# Patient Record
Sex: FEMALE | Race: WHITE | NOT HISPANIC OR LATINO | Employment: OTHER | URBAN - METROPOLITAN AREA
[De-identification: names, ages, dates, MRNs, and addresses within clinical notes are randomized per-mention and may not be internally consistent; named-entity substitution may affect disease eponyms.]

---

## 2018-01-25 ENCOUNTER — OFFICE VISIT CONVERTED (OUTPATIENT)
Dept: FAMILY MEDICINE CLINIC | Facility: CLINIC | Age: 43
End: 2018-01-25
Attending: NURSE PRACTITIONER

## 2018-03-19 ENCOUNTER — OFFICE VISIT CONVERTED (OUTPATIENT)
Dept: FAMILY MEDICINE CLINIC | Facility: CLINIC | Age: 43
End: 2018-03-19
Attending: NURSE PRACTITIONER

## 2018-06-28 ENCOUNTER — OFFICE VISIT CONVERTED (OUTPATIENT)
Dept: FAMILY MEDICINE CLINIC | Facility: CLINIC | Age: 43
End: 2018-06-28
Attending: NURSE PRACTITIONER

## 2018-06-28 ENCOUNTER — CONVERSION ENCOUNTER (OUTPATIENT)
Dept: FAMILY MEDICINE CLINIC | Facility: CLINIC | Age: 43
End: 2018-06-28

## 2018-09-17 ENCOUNTER — CONVERSION ENCOUNTER (OUTPATIENT)
Dept: FAMILY MEDICINE CLINIC | Facility: CLINIC | Age: 43
End: 2018-09-17

## 2018-09-17 ENCOUNTER — OFFICE VISIT CONVERTED (OUTPATIENT)
Dept: FAMILY MEDICINE CLINIC | Facility: CLINIC | Age: 43
End: 2018-09-17
Attending: NURSE PRACTITIONER

## 2018-12-13 ENCOUNTER — CONVERSION ENCOUNTER (OUTPATIENT)
Dept: FAMILY MEDICINE CLINIC | Facility: CLINIC | Age: 43
End: 2018-12-13

## 2019-01-10 ENCOUNTER — OFFICE VISIT CONVERTED (OUTPATIENT)
Dept: FAMILY MEDICINE CLINIC | Facility: CLINIC | Age: 44
End: 2019-01-10
Attending: NURSE PRACTITIONER

## 2019-03-26 ENCOUNTER — OFFICE VISIT CONVERTED (OUTPATIENT)
Dept: FAMILY MEDICINE CLINIC | Facility: CLINIC | Age: 44
End: 2019-03-26
Attending: NURSE PRACTITIONER

## 2019-04-02 ENCOUNTER — OFFICE VISIT CONVERTED (OUTPATIENT)
Dept: SURGERY | Facility: CLINIC | Age: 44
End: 2019-04-02
Attending: SURGERY

## 2019-04-10 ENCOUNTER — HOSPITAL ENCOUNTER (OUTPATIENT)
Dept: GASTROENTEROLOGY | Facility: HOSPITAL | Age: 44
Setting detail: HOSPITAL OUTPATIENT SURGERY
Discharge: HOME | End: 2019-04-10
Attending: SURGERY

## 2019-04-10 LAB — HCG UR QL: NEGATIVE

## 2019-05-24 ENCOUNTER — OFFICE VISIT CONVERTED (OUTPATIENT)
Dept: FAMILY MEDICINE CLINIC | Facility: CLINIC | Age: 44
End: 2019-05-24
Attending: NURSE PRACTITIONER

## 2019-05-24 ENCOUNTER — CONVERSION ENCOUNTER (OUTPATIENT)
Dept: FAMILY MEDICINE CLINIC | Facility: CLINIC | Age: 44
End: 2019-05-24

## 2019-06-04 ENCOUNTER — OFFICE VISIT CONVERTED (OUTPATIENT)
Dept: FAMILY MEDICINE CLINIC | Facility: CLINIC | Age: 44
End: 2019-06-04
Attending: NURSE PRACTITIONER

## 2019-07-01 ENCOUNTER — HOSPITAL ENCOUNTER (OUTPATIENT)
Dept: FAMILY MEDICINE CLINIC | Facility: CLINIC | Age: 44
Discharge: HOME | End: 2019-07-01
Attending: NURSE PRACTITIONER

## 2019-07-01 LAB
ALBUMIN SERPL-MCNC: 3.9 G/DL (ref 3.5–5)
ALBUMIN/GLOB SERPL: 1.4 {RATIO} (ref 1.4–2.6)
ALP SERPL-CCNC: 55 U/L (ref 42–98)
ALT SERPL-CCNC: 14 U/L (ref 10–40)
ANION GAP SERPL CALC-SCNC: 17 MMOL/L (ref 8–19)
AST SERPL-CCNC: 21 U/L (ref 15–50)
BASOPHILS # BLD AUTO: 0.06 10*3/UL (ref 0–0.2)
BASOPHILS NFR BLD AUTO: 0.9 % (ref 0–3)
BILIRUB SERPL-MCNC: 0.24 MG/DL (ref 0.2–1.3)
BUN SERPL-MCNC: 11 MG/DL (ref 5–25)
BUN/CREAT SERPL: 12 {RATIO} (ref 6–20)
CALCIUM SERPL-MCNC: 9 MG/DL (ref 8.7–10.4)
CHLORIDE SERPL-SCNC: 105 MMOL/L (ref 99–111)
CHOLEST SERPL-MCNC: 128 MG/DL (ref 107–200)
CHOLEST/HDLC SERPL: 3.7 {RATIO} (ref 3–6)
CONV ABS IMM GRAN: 0.02 10*3/UL (ref 0–0.2)
CONV CO2: 24 MMOL/L (ref 22–32)
CONV IMMATURE GRAN: 0.3 % (ref 0–1.8)
CONV TOTAL PROTEIN: 6.7 G/DL (ref 6.3–8.2)
CREAT UR-MCNC: 0.95 MG/DL (ref 0.5–0.9)
DEPRECATED RDW RBC AUTO: 45.7 FL (ref 36.4–46.3)
EOSINOPHIL # BLD AUTO: 0.22 10*3/UL (ref 0–0.7)
EOSINOPHIL # BLD AUTO: 3.2 % (ref 0–7)
ERYTHROCYTE [DISTWIDTH] IN BLOOD BY AUTOMATED COUNT: 13.2 % (ref 11.7–14.4)
GFR SERPLBLD BASED ON 1.73 SQ M-ARVRAT: >60 ML/MIN/{1.73_M2}
GLOBULIN UR ELPH-MCNC: 2.8 G/DL (ref 2–3.5)
GLUCOSE SERPL-MCNC: 86 MG/DL (ref 65–99)
HBA1C MFR BLD: 13 G/DL (ref 12–16)
HCT VFR BLD AUTO: 41.8 % (ref 37–47)
HDLC SERPL-MCNC: 35 MG/DL (ref 40–60)
LDLC SERPL CALC-MCNC: 79 MG/DL (ref 70–100)
LYMPHOCYTES # BLD AUTO: 2.86 10*3/UL (ref 1–5)
MCH RBC QN AUTO: 29.3 PG (ref 27–31)
MCHC RBC AUTO-ENTMCNC: 31.1 G/DL (ref 33–37)
MCV RBC AUTO: 94.1 FL (ref 81–99)
MONOCYTES # BLD AUTO: 0.53 10*3/UL (ref 0.2–1.2)
MONOCYTES NFR BLD AUTO: 7.6 % (ref 3–10)
NEUTROPHILS # BLD AUTO: 3.25 10*3/UL (ref 2–8)
NEUTROPHILS NFR BLD AUTO: 46.8 % (ref 30–85)
NRBC CBCN: 0 % (ref 0–0.7)
OSMOLALITY SERPL CALC.SUM OF ELEC: 291 MOSM/KG (ref 273–304)
PLATELET # BLD AUTO: 461 10*3/UL (ref 130–400)
PMV BLD AUTO: 11.5 FL (ref 9.4–12.3)
POTASSIUM SERPL-SCNC: 4.6 MMOL/L (ref 3.5–5.3)
RBC # BLD AUTO: 4.44 10*6/UL (ref 4.2–5.4)
SODIUM SERPL-SCNC: 141 MMOL/L (ref 135–147)
TRIGL SERPL-MCNC: 72 MG/DL (ref 40–150)
VARIANT LYMPHS NFR BLD MANUAL: 41.2 % (ref 20–45)
VLDLC SERPL-MCNC: 14 MG/DL (ref 5–37)
WBC # BLD AUTO: 6.94 10*3/UL (ref 4.8–10.8)

## 2019-07-02 LAB — 25(OH)D3 SERPL-MCNC: 38.6 NG/ML (ref 30–100)

## 2019-08-05 ENCOUNTER — HOSPITAL ENCOUNTER (OUTPATIENT)
Dept: OTHER | Facility: HOSPITAL | Age: 44
Discharge: HOME | End: 2019-08-05

## 2019-08-08 ENCOUNTER — HOSPITAL ENCOUNTER (OUTPATIENT)
Dept: OTHER | Facility: HOSPITAL | Age: 44
Discharge: HOME | End: 2019-08-08

## 2019-08-08 LAB
25(OH)D3 SERPL-MCNC: 44.4 NG/ML (ref 30–100)
ALBUMIN SERPL-MCNC: 4.5 G/DL (ref 3.5–5)
ALBUMIN/GLOB SERPL: 1.6 {RATIO} (ref 1.4–2.6)
ALP SERPL-CCNC: 79 U/L (ref 42–98)
ALT SERPL-CCNC: 13 U/L (ref 10–40)
ANION GAP SERPL CALC-SCNC: 16 MMOL/L (ref 8–19)
AST SERPL-CCNC: 18 U/L (ref 15–50)
BILIRUB SERPL-MCNC: 0.23 MG/DL (ref 0.2–1.3)
BUN SERPL-MCNC: 16 MG/DL (ref 5–25)
BUN/CREAT SERPL: 18 {RATIO} (ref 6–20)
CALCIUM SERPL-MCNC: 9.2 MG/DL (ref 8.7–10.4)
CHLORIDE SERPL-SCNC: 98 MMOL/L (ref 99–111)
CHOLEST SERPL-MCNC: 139 MG/DL (ref 107–200)
CHOLEST/HDLC SERPL: 4 {RATIO} (ref 3–6)
CONV CO2: 23 MMOL/L (ref 22–32)
CONV TOTAL PROTEIN: 7.3 G/DL (ref 6.3–8.2)
CREAT UR-MCNC: 0.87 MG/DL (ref 0.5–0.9)
GFR SERPLBLD BASED ON 1.73 SQ M-ARVRAT: >60 ML/MIN/{1.73_M2}
GLOBULIN UR ELPH-MCNC: 2.8 G/DL (ref 2–3.5)
GLUCOSE SERPL-MCNC: 89 MG/DL (ref 65–99)
HDLC SERPL-MCNC: 35 MG/DL (ref 40–60)
LDLC SERPL CALC-MCNC: 81 MG/DL (ref 70–100)
OSMOLALITY SERPL CALC.SUM OF ELEC: 277 MOSM/KG (ref 273–304)
POTASSIUM SERPL-SCNC: 4.1 MMOL/L (ref 3.5–5.3)
SODIUM SERPL-SCNC: 133 MMOL/L (ref 135–147)
TRIGL SERPL-MCNC: 116 MG/DL (ref 40–150)
VLDLC SERPL-MCNC: 23 MG/DL (ref 5–37)

## 2019-08-09 LAB
B BURGDOR IGG+IGM SER-ACNC: <0.91 ISR (ref 0–0.9)
H PYLORI IGA SER QL: <9 UNITS (ref 0–8.9)
H PYLORI IGM SER-ACNC: <9 UNITS (ref 0–8.9)

## 2019-08-10 LAB
R RICKETTSI IGG SER QL IA: NEGATIVE
R RICKETTSI IGM TITR SER: 1.11 INDEX (ref 0–0.89)

## 2019-08-11 LAB
CONV CELIAC DISEASE AB-IGA: 175 MG/DL (ref 68–408)
GLUTEN IGE QN: 8.16 MCG/ML
TTG IGA SER-ACNC: 0 U/ML (ref 0–3)

## 2019-08-12 LAB
BARLEY IGE QN: <0.1
BEEF IGE QN: <0.1 K[IU]/ML (ref 0–0.35)
CHICKEN DROP IGE QN: <0.1
COCOA IGE QN: <0.1 K[IU]/ML (ref 0–0.35)
CONV SCORING INTERPRETATION: NORMAL
CORN IGE QN: <0.1 K[IU]/ML (ref 0–0.35)
COW MILK IGE QN: <0.1 K[IU]/ML (ref 0–0.35)
EGG WHITE IGE QN: <0.1 K[IU]/ML (ref 0–0.35)
GLUTEN IGE QN: <0.1 KU/L
IGE BREWER'S YEAST: <0.1 K[IU]/ML (ref 0–0.35)
IGE SERPL-ACNC: 39 K[IU]/ML (ref 0–24)
IMMUNOCAP RESULT: ABNORMAL (ref 0–0)
LETTUCE IGE QN: <0.1 K[IU]/ML
MALT IGE QN: <0.1
OAT IGE QN: <0.1 K[IU]/ML (ref 0–0.35)
ORANGE IGE QN: <0.1
PEANUT IGE QN: <0.1 K[IU]/ML (ref 0–0.35)
PORK IGE: <0.1 K[IU]/ML (ref 0–0.35)
POTATO IGE QN: <0.1 K[IU]/ML (ref 0–0.35)
RYE IGE: <0.1
SOYBEAN IGE QN: <0.1 K[IU]/ML (ref 0–0.35)
TOMATO IGE QN: <0.1
WHEAT IGE QN: <0.1 K[IU]/ML (ref 0–0.35)

## 2019-08-13 LAB — CONV ANTI GALACTOSE ALPHA 1,3 IGE: <0.1 KU/L

## 2021-05-15 VITALS
TEMPERATURE: 98.1 F | SYSTOLIC BLOOD PRESSURE: 114 MMHG | RESPIRATION RATE: 16 BRPM | HEART RATE: 70 BPM | OXYGEN SATURATION: 96 % | DIASTOLIC BLOOD PRESSURE: 76 MMHG | HEIGHT: 63 IN | WEIGHT: 180.25 LBS | BODY MASS INDEX: 31.94 KG/M2

## 2021-05-15 VITALS
SYSTOLIC BLOOD PRESSURE: 122 MMHG | OXYGEN SATURATION: 98 % | HEART RATE: 78 BPM | RESPIRATION RATE: 12 BRPM | WEIGHT: 176 LBS | DIASTOLIC BLOOD PRESSURE: 80 MMHG | TEMPERATURE: 98.8 F | HEIGHT: 63 IN | BODY MASS INDEX: 31.18 KG/M2

## 2021-05-15 VITALS — HEIGHT: 63 IN | RESPIRATION RATE: 14 BRPM | BODY MASS INDEX: 32.62 KG/M2 | WEIGHT: 184.12 LBS

## 2021-05-15 VITALS
OXYGEN SATURATION: 97 % | RESPIRATION RATE: 12 BRPM | BODY MASS INDEX: 32.86 KG/M2 | WEIGHT: 185.44 LBS | HEART RATE: 74 BPM | HEIGHT: 63 IN | DIASTOLIC BLOOD PRESSURE: 80 MMHG | TEMPERATURE: 98.5 F | SYSTOLIC BLOOD PRESSURE: 129 MMHG

## 2021-05-16 VITALS
HEIGHT: 63 IN | DIASTOLIC BLOOD PRESSURE: 80 MMHG | TEMPERATURE: 98.2 F | WEIGHT: 184.19 LBS | HEART RATE: 95 BPM | RESPIRATION RATE: 12 BRPM | SYSTOLIC BLOOD PRESSURE: 131 MMHG | BODY MASS INDEX: 32.64 KG/M2 | OXYGEN SATURATION: 98 %

## 2021-05-16 VITALS
SYSTOLIC BLOOD PRESSURE: 136 MMHG | TEMPERATURE: 97.8 F | OXYGEN SATURATION: 99 % | HEIGHT: 63 IN | HEART RATE: 80 BPM | RESPIRATION RATE: 12 BRPM | DIASTOLIC BLOOD PRESSURE: 78 MMHG | BODY MASS INDEX: 33.57 KG/M2 | WEIGHT: 189.44 LBS

## 2021-05-16 VITALS — SYSTOLIC BLOOD PRESSURE: 118 MMHG | DIASTOLIC BLOOD PRESSURE: 70 MMHG

## 2021-05-16 VITALS
WEIGHT: 195 LBS | SYSTOLIC BLOOD PRESSURE: 144 MMHG | DIASTOLIC BLOOD PRESSURE: 90 MMHG | BODY MASS INDEX: 34.55 KG/M2 | HEART RATE: 117 BPM | HEIGHT: 63 IN | TEMPERATURE: 97.7 F | RESPIRATION RATE: 12 BRPM | OXYGEN SATURATION: 97 %

## 2021-05-16 VITALS
DIASTOLIC BLOOD PRESSURE: 81 MMHG | SYSTOLIC BLOOD PRESSURE: 133 MMHG | HEIGHT: 63 IN | HEART RATE: 102 BPM | BODY MASS INDEX: 33.66 KG/M2 | OXYGEN SATURATION: 97 % | TEMPERATURE: 97.8 F | RESPIRATION RATE: 12 BRPM | WEIGHT: 190 LBS

## 2021-05-16 VITALS — DIASTOLIC BLOOD PRESSURE: 78 MMHG | SYSTOLIC BLOOD PRESSURE: 118 MMHG

## 2021-05-16 VITALS
TEMPERATURE: 98.6 F | HEART RATE: 107 BPM | BODY MASS INDEX: 33.23 KG/M2 | WEIGHT: 187.56 LBS | SYSTOLIC BLOOD PRESSURE: 134 MMHG | DIASTOLIC BLOOD PRESSURE: 86 MMHG | HEIGHT: 63 IN | RESPIRATION RATE: 12 BRPM | OXYGEN SATURATION: 99 %

## 2022-05-02 ENCOUNTER — LAB REQUISITION (OUTPATIENT)
Dept: LAB | Facility: HOSPITAL | Age: 47
End: 2022-05-02

## 2022-05-02 DIAGNOSIS — L08.9 LOCAL INFECTION OF THE SKIN AND SUBCUTANEOUS TISSUE, UNSPECIFIED: ICD-10-CM

## 2022-05-02 LAB
ALBUMIN SERPL-MCNC: 3.9 G/DL (ref 3.5–5.2)
ALBUMIN/GLOB SERPL: 1.3 G/DL
ALP SERPL-CCNC: 90 U/L (ref 39–117)
ALT SERPL W P-5'-P-CCNC: 42 U/L (ref 1–33)
ANION GAP SERPL CALCULATED.3IONS-SCNC: 10 MMOL/L (ref 5–15)
AST SERPL-CCNC: 24 U/L (ref 1–32)
BASOPHILS # BLD AUTO: 0.06 10*3/MM3 (ref 0–0.2)
BASOPHILS NFR BLD AUTO: 0.7 % (ref 0–1.5)
BILIRUB SERPL-MCNC: <0.2 MG/DL (ref 0–1.2)
BUN SERPL-MCNC: 9 MG/DL (ref 6–20)
BUN/CREAT SERPL: 13.4 (ref 7–25)
CALCIUM SPEC-SCNC: 9.3 MG/DL (ref 8.6–10.5)
CHLORIDE SERPL-SCNC: 100 MMOL/L (ref 98–107)
CO2 SERPL-SCNC: 25 MMOL/L (ref 22–29)
CREAT SERPL-MCNC: 0.67 MG/DL (ref 0.57–1)
CRP SERPL-MCNC: 0.4 MG/DL (ref 0–0.5)
DEPRECATED RDW RBC AUTO: 43.8 FL (ref 37–54)
EGFRCR SERPLBLD CKD-EPI 2021: 110 ML/MIN/1.73
EOSINOPHIL # BLD AUTO: 0.21 10*3/MM3 (ref 0–0.4)
EOSINOPHIL NFR BLD AUTO: 2.6 % (ref 0.3–6.2)
ERYTHROCYTE [DISTWIDTH] IN BLOOD BY AUTOMATED COUNT: 14.1 % (ref 12.3–15.4)
ERYTHROCYTE [SEDIMENTATION RATE] IN BLOOD: 14 MM/HR (ref 0–20)
GLOBULIN UR ELPH-MCNC: 2.9 GM/DL
GLUCOSE SERPL-MCNC: 109 MG/DL (ref 65–99)
HCT VFR BLD AUTO: 33.8 % (ref 34–46.6)
HGB BLD-MCNC: 11.2 G/DL (ref 12–15.9)
IMM GRANULOCYTES # BLD AUTO: 0.04 10*3/MM3 (ref 0–0.05)
IMM GRANULOCYTES NFR BLD AUTO: 0.5 % (ref 0–0.5)
LYMPHOCYTES # BLD AUTO: 2.11 10*3/MM3 (ref 0.7–3.1)
LYMPHOCYTES NFR BLD AUTO: 25.9 % (ref 19.6–45.3)
MCH RBC QN AUTO: 28.4 PG (ref 26.6–33)
MCHC RBC AUTO-ENTMCNC: 33.1 G/DL (ref 31.5–35.7)
MCV RBC AUTO: 85.6 FL (ref 79–97)
MONOCYTES # BLD AUTO: 0.5 10*3/MM3 (ref 0.1–0.9)
MONOCYTES NFR BLD AUTO: 6.1 % (ref 5–12)
NEUTROPHILS NFR BLD AUTO: 5.22 10*3/MM3 (ref 1.7–7)
NEUTROPHILS NFR BLD AUTO: 64.2 % (ref 42.7–76)
NRBC BLD AUTO-RTO: 0 /100 WBC (ref 0–0.2)
PLATELET # BLD AUTO: 537 10*3/MM3 (ref 140–450)
PMV BLD AUTO: 10.5 FL (ref 6–12)
POTASSIUM SERPL-SCNC: 4.1 MMOL/L (ref 3.5–5.2)
PROT SERPL-MCNC: 6.8 G/DL (ref 6–8.5)
RBC # BLD AUTO: 3.95 10*6/MM3 (ref 3.77–5.28)
SODIUM SERPL-SCNC: 135 MMOL/L (ref 136–145)
VANCOMYCIN TROUGH SERPL-MCNC: 5.8 MCG/ML (ref 5–20)
WBC NRBC COR # BLD: 8.14 10*3/MM3 (ref 3.4–10.8)

## 2022-05-02 PROCEDURE — 85652 RBC SED RATE AUTOMATED: CPT | Performed by: ORTHOPAEDIC SURGERY

## 2022-05-02 PROCEDURE — 80202 ASSAY OF VANCOMYCIN: CPT | Performed by: ORTHOPAEDIC SURGERY

## 2022-05-02 PROCEDURE — 85025 COMPLETE CBC W/AUTO DIFF WBC: CPT | Performed by: ORTHOPAEDIC SURGERY

## 2022-05-02 PROCEDURE — 86140 C-REACTIVE PROTEIN: CPT | Performed by: ORTHOPAEDIC SURGERY

## 2022-05-02 PROCEDURE — 80053 COMPREHEN METABOLIC PANEL: CPT | Performed by: ORTHOPAEDIC SURGERY

## 2022-05-06 ENCOUNTER — LAB REQUISITION (OUTPATIENT)
Dept: LAB | Facility: HOSPITAL | Age: 47
End: 2022-05-06

## 2022-05-06 DIAGNOSIS — Z79.899 OTHER LONG TERM (CURRENT) DRUG THERAPY: ICD-10-CM

## 2022-05-06 DIAGNOSIS — L08.9 LOCAL INFECTION OF THE SKIN AND SUBCUTANEOUS TISSUE, UNSPECIFIED: ICD-10-CM

## 2022-05-06 LAB — VANCOMYCIN TROUGH SERPL-MCNC: <4 MCG/ML (ref 5–20)

## 2022-05-06 PROCEDURE — 80202 ASSAY OF VANCOMYCIN: CPT | Performed by: ORTHOPAEDIC SURGERY

## 2023-01-19 ENCOUNTER — HOSPITAL ENCOUNTER (OUTPATIENT)
Dept: MAMMOGRAPHY | Facility: HOSPITAL | Age: 48
Discharge: HOME | End: 2023-01-19
Payer: MEDICARE

## 2023-01-19 ENCOUNTER — HOSPITAL ENCOUNTER (OUTPATIENT)
Dept: ULTRASOUND IMAGING | Facility: HOSPITAL | Age: 48
Discharge: HOME | End: 2023-01-19
Payer: MEDICARE

## 2023-01-19 DIAGNOSIS — N63.10 BREAST MASS, RIGHT: ICD-10-CM

## 2023-01-19 PROCEDURE — 76642 ULTRASOUND BREAST LIMITED: CPT

## 2023-01-19 PROCEDURE — G0279 TOMOSYNTHESIS, MAMMO: HCPCS

## 2023-01-19 PROCEDURE — 77066 DX MAMMO INCL CAD BI: CPT

## 2023-01-26 ENCOUNTER — PATIENT OUTREACH (OUTPATIENT)
Dept: ONCOLOGY | Facility: HOSPITAL | Age: 48
End: 2023-01-26
Payer: MEDICARE

## 2023-01-26 ENCOUNTER — HOSPITAL ENCOUNTER (OUTPATIENT)
Dept: MAMMOGRAPHY | Facility: HOSPITAL | Age: 48
Discharge: HOME | End: 2023-01-26
Payer: MEDICARE

## 2023-02-01 ENCOUNTER — PATIENT OUTREACH (OUTPATIENT)
Dept: ONCOLOGY | Facility: HOSPITAL | Age: 48
End: 2023-02-01
Payer: MEDICARE

## 2023-02-01 ENCOUNTER — HOSPITAL ENCOUNTER (OUTPATIENT)
Dept: MAMMOGRAPHY | Facility: HOSPITAL | Age: 48
Discharge: HOME | End: 2023-02-01
Payer: MEDICARE

## 2023-02-06 ENCOUNTER — HOSPITAL ENCOUNTER (OUTPATIENT)
Dept: MAMMOGRAPHY | Facility: HOSPITAL | Age: 48
Discharge: HOME | End: 2023-02-06
Payer: MEDICARE

## 2023-02-06 ENCOUNTER — PATIENT OUTREACH (OUTPATIENT)
Dept: ONCOLOGY | Facility: HOSPITAL | Age: 48
End: 2023-02-06
Payer: MEDICARE

## 2023-02-06 DIAGNOSIS — R22.31 AXILLARY MASS, RIGHT: ICD-10-CM

## 2023-02-06 DIAGNOSIS — N63.10 BREAST MASS, RIGHT: ICD-10-CM

## 2023-02-06 PROCEDURE — 88360 TUMOR IMMUNOHISTOCHEM/MANUAL: CPT | Performed by: SURGERY

## 2023-02-06 PROCEDURE — 88305 TISSUE EXAM BY PATHOLOGIST: CPT | Performed by: SURGERY

## 2023-02-06 PROCEDURE — 0 LIDOCAINE 1 % SOLUTION: Performed by: SURGERY

## 2023-02-06 PROCEDURE — 88341 IMHCHEM/IMCYTCHM EA ADD ANTB: CPT | Performed by: SURGERY

## 2023-02-06 PROCEDURE — A4648 IMPLANTABLE TISSUE MARKER: HCPCS

## 2023-02-06 PROCEDURE — 88342 IMHCHEM/IMCYTCHM 1ST ANTB: CPT | Performed by: SURGERY

## 2023-02-06 RX ORDER — LIDOCAINE HYDROCHLORIDE 10 MG/ML
10 INJECTION, SOLUTION INFILTRATION; PERINEURAL ONCE
Status: COMPLETED | OUTPATIENT
Start: 2023-02-06 | End: 2023-02-06

## 2023-02-06 RX ORDER — LIDOCAINE HYDROCHLORIDE AND EPINEPHRINE 10; 10 MG/ML; UG/ML
10 INJECTION, SOLUTION INFILTRATION; PERINEURAL ONCE
Status: COMPLETED | OUTPATIENT
Start: 2023-02-06 | End: 2023-02-06

## 2023-02-06 RX ADMIN — LIDOCAINE HYDROCHLORIDE 10 ML: 10 INJECTION, SOLUTION INFILTRATION; PERINEURAL at 13:52

## 2023-02-06 RX ADMIN — LIDOCAINE HYDROCHLORIDE,EPINEPHRINE BITARTRATE 10 ML: 10; .01 INJECTION, SOLUTION INFILTRATION; PERINEURAL at 13:52

## 2023-02-06 RX ADMIN — LIDOCAINE HYDROCHLORIDE,EPINEPHRINE BITARTRATE 20 ML: 10; .01 INJECTION, SOLUTION INFILTRATION; PERINEURAL at 13:51

## 2023-02-06 RX ADMIN — LIDOCAINE HYDROCHLORIDE 10 ML: 10 INJECTION, SOLUTION INFILTRATION; PERINEURAL at 13:50

## 2023-02-09 LAB
CYTO UR: NORMAL
LAB AP CASE REPORT: NORMAL
LAB AP CLINICAL INFORMATION: NORMAL
LAB AP SPECIAL STAINS: NORMAL
PATH REPORT.FINAL DX SPEC: NORMAL
PATH REPORT.GROSS SPEC: NORMAL

## 2023-02-17 ENCOUNTER — PATIENT OUTREACH (OUTPATIENT)
Dept: ONCOLOGY | Facility: HOSPITAL | Age: 48
End: 2023-02-17
Payer: MEDICARE

## 2023-02-20 ENCOUNTER — PATIENT OUTREACH (OUTPATIENT)
Dept: ONCOLOGY | Facility: HOSPITAL | Age: 48
End: 2023-02-20
Payer: MEDICARE

## 2023-03-09 ENCOUNTER — TELEPHONE (OUTPATIENT)
Dept: ONCOLOGY | Facility: HOSPITAL | Age: 48
End: 2023-03-09

## 2023-03-09 NOTE — TELEPHONE ENCOUNTER
Caller: ADIEL MUJICA    Relationship to patient: DR. CARBAJAL'S OFFICE    Best call back number: [phone]    Type of visit: NEW PATIENT    Requested date: ANY ASAP EXCEPT 03/14 AND 03/16    If rescheduling, when is the original appointment: 03/16    Additional notes: PLEASE CALL ADIEL TO R/S.

## 2023-03-10 ENCOUNTER — PATIENT OUTREACH (OUTPATIENT)
Dept: ONCOLOGY | Facility: HOSPITAL | Age: 48
End: 2023-03-10
Payer: MEDICARE

## 2023-03-20 ENCOUNTER — PATIENT OUTREACH (OUTPATIENT)
Dept: ONCOLOGY | Facility: HOSPITAL | Age: 48
End: 2023-03-20
Payer: MEDICARE

## 2023-03-20 NOTE — PROGRESS NOTES
CALLED [employer] AND THEY CALLED [facility] TO MAKE SURE THEY GOT THE ORDER TO SEND SPECIMEN TO RUN AN ONCOTYPE. THEY DID GET THE ORDER AND SAID THEY WILL SEND SPECIMEN. 3/20/23 [employer] HAS NOT RECEIVED THE SPECIMEN. SENT MESSAGE TO RAMONE TO INFORM HER OF STATUS.

## 2023-03-21 ENCOUNTER — PATIENT OUTREACH (OUTPATIENT)
Dept: ONCOLOGY | Facility: HOSPITAL | Age: 48
End: 2023-03-21
Payer: MEDICARE

## 2023-03-24 ENCOUNTER — PATIENT OUTREACH (OUTPATIENT)
Dept: ONCOLOGY | Facility: HOSPITAL | Age: 48
End: 2023-03-24
Payer: MEDICARE

## 2023-03-29 ENCOUNTER — PATIENT OUTREACH (OUTPATIENT)
Dept: ONCOLOGY | Facility: HOSPITAL | Age: 48
End: 2023-03-29
Payer: MEDICARE

## 2023-04-04 ENCOUNTER — PATIENT OUTREACH (OUTPATIENT)
Dept: ONCOLOGY | Facility: HOSPITAL | Age: 48
End: 2023-04-04
Payer: MEDICARE

## 2023-04-06 ENCOUNTER — DOCUMENTATION (OUTPATIENT)
Dept: ONCOLOGY | Facility: HOSPITAL | Age: 48
End: 2023-04-06
Payer: MEDICARE

## 2023-04-06 ENCOUNTER — LAB (OUTPATIENT)
Dept: ONCOLOGY | Facility: HOSPITAL | Age: 48
End: 2023-04-06
Payer: MEDICARE

## 2023-04-06 ENCOUNTER — CONSULT (OUTPATIENT)
Dept: ONCOLOGY | Facility: HOSPITAL | Age: 48
End: 2023-04-06
Payer: MEDICARE

## 2023-04-06 VITALS
RESPIRATION RATE: 16 BRPM | HEART RATE: 71 BPM | BODY MASS INDEX: 29.84 KG/M2 | WEIGHT: 168.43 LBS | DIASTOLIC BLOOD PRESSURE: 59 MMHG | TEMPERATURE: 97.8 F | SYSTOLIC BLOOD PRESSURE: 111 MMHG | OXYGEN SATURATION: 99 % | HEIGHT: 63 IN

## 2023-04-06 DIAGNOSIS — C50.911 MALIGNANT NEOPLASM OF RIGHT BREAST IN FEMALE, ESTROGEN RECEPTOR POSITIVE, UNSPECIFIED SITE OF BREAST: Primary | ICD-10-CM

## 2023-04-06 DIAGNOSIS — R11.2 NAUSEA AND VOMITING, UNSPECIFIED VOMITING TYPE: ICD-10-CM

## 2023-04-06 DIAGNOSIS — Z17.0 MALIGNANT NEOPLASM OF RIGHT BREAST IN FEMALE, ESTROGEN RECEPTOR POSITIVE, UNSPECIFIED SITE OF BREAST: ICD-10-CM

## 2023-04-06 DIAGNOSIS — Z89.512 HISTORY OF BELOW-KNEE AMPUTATION OF LEFT LOWER EXTREMITY: ICD-10-CM

## 2023-04-06 DIAGNOSIS — R63.4 WEIGHT LOSS, UNINTENTIONAL: ICD-10-CM

## 2023-04-06 DIAGNOSIS — C50.911 MALIGNANT NEOPLASM OF RIGHT BREAST IN FEMALE, ESTROGEN RECEPTOR POSITIVE, UNSPECIFIED SITE OF BREAST: ICD-10-CM

## 2023-04-06 DIAGNOSIS — F32.A DEPRESSION, UNSPECIFIED DEPRESSION TYPE: ICD-10-CM

## 2023-04-06 DIAGNOSIS — Z17.0 MALIGNANT NEOPLASM OF RIGHT BREAST IN FEMALE, ESTROGEN RECEPTOR POSITIVE, UNSPECIFIED SITE OF BREAST: Primary | ICD-10-CM

## 2023-04-06 DIAGNOSIS — M86.9 OSTEOMYELITIS OF LEFT TIBIA, UNSPECIFIED TYPE: ICD-10-CM

## 2023-04-06 PROBLEM — M79.605 PAIN OF LEFT LOWER EXTREMITY: Status: ACTIVE | Noted: 2022-02-18

## 2023-04-06 PROBLEM — F33.0 MILD EPISODE OF RECURRENT MAJOR DEPRESSIVE DISORDER: Status: ACTIVE | Noted: 2018-09-17

## 2023-04-06 PROBLEM — K21.9 GASTROESOPHAGEAL REFLUX DISEASE WITHOUT ESOPHAGITIS: Status: ACTIVE | Noted: 2018-09-17

## 2023-04-06 PROBLEM — E55.9 VITAMIN D DEFICIENCY: Status: ACTIVE | Noted: 2023-04-06

## 2023-04-06 PROBLEM — S88.119A AMPUTATED BELOW KNEE: Status: ACTIVE | Noted: 2018-04-29

## 2023-04-06 PROBLEM — T14.8XXA BROKEN BONES: Status: ACTIVE | Noted: 2021-02-13

## 2023-04-06 PROBLEM — B96.81 H PYLORI ULCER: Status: ACTIVE | Noted: 2023-04-06

## 2023-04-06 PROBLEM — F41.9 ANXIETY: Status: ACTIVE | Noted: 2023-04-06

## 2023-04-06 PROBLEM — M25.579 PAIN IN JOINT INVOLVING ANKLE AND FOOT: Status: ACTIVE | Noted: 2020-04-28

## 2023-04-06 PROBLEM — R20.2 PARESTHESIA: Status: ACTIVE | Noted: 2022-08-11

## 2023-04-06 PROBLEM — Z85.3 PERSONAL HISTORY OF BREAST CANCER: Status: ACTIVE | Noted: 2023-02-27

## 2023-04-06 PROBLEM — M79.2 PERIPHERAL NEURALGIA: Status: ACTIVE | Noted: 2020-04-28

## 2023-04-06 PROBLEM — T14.8XXA WOUND INFECTION: Status: ACTIVE | Noted: 2022-04-20

## 2023-04-06 PROBLEM — M79.672 LEFT FOOT PAIN: Status: ACTIVE | Noted: 2018-04-30

## 2023-04-06 PROBLEM — Z79.4 ENCOUNTER FOR LONG-TERM (CURRENT) USE OF INSULIN: Status: ACTIVE | Noted: 2018-02-27

## 2023-04-06 PROBLEM — R53.83 FATIGUE: Status: ACTIVE | Noted: 2018-09-17

## 2023-04-06 PROBLEM — D36.10 NEUROMA: Status: ACTIVE | Noted: 2022-08-11

## 2023-04-06 PROBLEM — L40.9 PSORIASIS: Status: ACTIVE | Noted: 2018-09-17

## 2023-04-06 PROBLEM — M25.50 MULTIPLE JOINT PAIN: Status: ACTIVE | Noted: 2018-02-27

## 2023-04-06 PROBLEM — N92.0 HEAVY PERIOD: Status: ACTIVE | Noted: 2018-09-17

## 2023-04-06 PROBLEM — G47.00 INSOMNIA, UNSPECIFIED: Status: ACTIVE | Noted: 2018-09-17

## 2023-04-06 PROBLEM — K27.9 H PYLORI ULCER: Status: ACTIVE | Noted: 2023-04-06

## 2023-04-06 PROBLEM — L08.9 WOUND INFECTION: Status: ACTIVE | Noted: 2022-04-20

## 2023-04-06 LAB
ALBUMIN SERPL-MCNC: 4.2 G/DL (ref 3.5–5.2)
ALBUMIN/GLOB SERPL: 1.6 G/DL
ALP SERPL-CCNC: 71 U/L (ref 39–117)
ALT SERPL W P-5'-P-CCNC: 23 U/L (ref 1–33)
ANION GAP SERPL CALCULATED.3IONS-SCNC: 10 MMOL/L (ref 5–15)
AST SERPL-CCNC: 27 U/L (ref 1–32)
BASOPHILS # BLD AUTO: 0.07 10*3/MM3 (ref 0–0.2)
BASOPHILS NFR BLD AUTO: 0.7 % (ref 0–1.5)
BILIRUB SERPL-MCNC: <0.2 MG/DL (ref 0–1.2)
BUN SERPL-MCNC: 13 MG/DL (ref 6–20)
BUN/CREAT SERPL: 14 (ref 7–25)
CALCIUM SPEC-SCNC: 8.9 MG/DL (ref 8.6–10.5)
CHLORIDE SERPL-SCNC: 100 MMOL/L (ref 98–107)
CO2 SERPL-SCNC: 26 MMOL/L (ref 22–29)
CREAT SERPL-MCNC: 0.93 MG/DL (ref 0.57–1)
DEPRECATED RDW RBC AUTO: 43 FL (ref 37–54)
EGFRCR SERPLBLD CKD-EPI 2021: 76.9 ML/MIN/1.73
EOSINOPHIL # BLD AUTO: 0.23 10*3/MM3 (ref 0–0.4)
EOSINOPHIL NFR BLD AUTO: 2.4 % (ref 0.3–6.2)
ERYTHROCYTE [DISTWIDTH] IN BLOOD BY AUTOMATED COUNT: 12.9 % (ref 12.3–15.4)
GLOBULIN UR ELPH-MCNC: 2.7 GM/DL
GLUCOSE SERPL-MCNC: 66 MG/DL (ref 65–99)
HCT VFR BLD AUTO: 39.5 % (ref 34–46.6)
HGB BLD-MCNC: 13.2 G/DL (ref 12–15.9)
IMM GRANULOCYTES # BLD AUTO: 0.03 10*3/MM3 (ref 0–0.05)
IMM GRANULOCYTES NFR BLD AUTO: 0.3 % (ref 0–0.5)
LYMPHOCYTES # BLD AUTO: 3.36 10*3/MM3 (ref 0.7–3.1)
LYMPHOCYTES NFR BLD AUTO: 34.7 % (ref 19.6–45.3)
MCH RBC QN AUTO: 30.6 PG (ref 26.6–33)
MCHC RBC AUTO-ENTMCNC: 33.4 G/DL (ref 31.5–35.7)
MCV RBC AUTO: 91.4 FL (ref 79–97)
MONOCYTES # BLD AUTO: 0.85 10*3/MM3 (ref 0.1–0.9)
MONOCYTES NFR BLD AUTO: 8.8 % (ref 5–12)
NEUTROPHILS NFR BLD AUTO: 5.15 10*3/MM3 (ref 1.7–7)
NEUTROPHILS NFR BLD AUTO: 53.1 % (ref 42.7–76)
NRBC BLD AUTO-RTO: 0 /100 WBC (ref 0–0.2)
PLATELET # BLD AUTO: 475 10*3/MM3 (ref 140–450)
PMV BLD AUTO: 9.3 FL (ref 6–12)
POTASSIUM SERPL-SCNC: 3.8 MMOL/L (ref 3.5–5.2)
PROT SERPL-MCNC: 6.9 G/DL (ref 6–8.5)
RBC # BLD AUTO: 4.32 10*6/MM3 (ref 3.77–5.28)
SODIUM SERPL-SCNC: 136 MMOL/L (ref 136–145)
WBC NRBC COR # BLD: 9.69 10*3/MM3 (ref 3.4–10.8)

## 2023-04-06 PROCEDURE — 99205 OFFICE O/P NEW HI 60 MIN: CPT | Performed by: INTERNAL MEDICINE

## 2023-04-06 PROCEDURE — G0463 HOSPITAL OUTPT CLINIC VISIT: HCPCS | Performed by: INTERNAL MEDICINE

## 2023-04-06 PROCEDURE — 85025 COMPLETE CBC W/AUTO DIFF WBC: CPT

## 2023-04-06 PROCEDURE — 80053 COMPREHEN METABOLIC PANEL: CPT

## 2023-04-06 PROCEDURE — 36415 COLL VENOUS BLD VENIPUNCTURE: CPT

## 2023-04-06 PROCEDURE — 1125F AMNT PAIN NOTED PAIN PRSNT: CPT | Performed by: INTERNAL MEDICINE

## 2023-04-06 RX ORDER — PHENOL 1.4 %
30 AEROSOL, SPRAY (ML) MUCOUS MEMBRANE
COMMUNITY

## 2023-04-06 RX ORDER — ASPIRIN 81 MG/1
TABLET ORAL
COMMUNITY

## 2023-04-06 RX ORDER — OMEPRAZOLE 20 MG/1
CAPSULE, DELAYED RELEASE ORAL
COMMUNITY

## 2023-04-06 RX ORDER — ONDANSETRON HYDROCHLORIDE 8 MG/1
TABLET, FILM COATED ORAL
COMMUNITY
Start: 2023-02-10 | End: 2023-04-14 | Stop reason: SDUPTHER

## 2023-04-06 RX ORDER — PANTOPRAZOLE SODIUM 40 MG/1
1 TABLET, DELAYED RELEASE ORAL EVERY 12 HOURS SCHEDULED
COMMUNITY
Start: 2023-02-28

## 2023-04-06 RX ORDER — SERTRALINE HYDROCHLORIDE 100 MG/1
1.5 TABLET, FILM COATED ORAL DAILY
COMMUNITY
Start: 2023-03-28

## 2023-04-06 RX ORDER — GABAPENTIN 400 MG/1
CAPSULE ORAL
COMMUNITY
Start: 2023-03-31

## 2023-04-06 RX ORDER — LISINOPRIL 10 MG/1
1 TABLET ORAL DAILY
COMMUNITY
Start: 2023-02-28

## 2023-04-06 RX ORDER — ERGOCALCIFEROL 1.25 MG/1
1 CAPSULE ORAL WEEKLY
COMMUNITY
Start: 2023-02-28

## 2023-04-06 RX ORDER — OXYCODONE AND ACETAMINOPHEN 10; 325 MG/1; MG/1
1 TABLET ORAL EVERY 4 HOURS PRN
COMMUNITY
Start: 2023-04-01

## 2023-04-06 RX ORDER — QUETIAPINE FUMARATE 200 MG/1
200 TABLET, FILM COATED ORAL DAILY
COMMUNITY
Start: 2022-12-13

## 2023-04-06 RX ORDER — DULOXETIN HYDROCHLORIDE 60 MG/1
1 CAPSULE, DELAYED RELEASE ORAL DAILY
COMMUNITY
Start: 2023-02-28

## 2023-04-06 RX ORDER — ATORVASTATIN CALCIUM 10 MG/1
TABLET, FILM COATED ORAL
COMMUNITY
Start: 2022-12-13

## 2023-04-06 RX ORDER — PSEUDOEPHED/ACETAMINOPH/DIPHEN 30MG-500MG
TABLET ORAL
COMMUNITY
Start: 2023-03-17

## 2023-04-06 NOTE — PROGRESS NOTES
Patient  Coby Taylor    Location  [facility] HEMATOLOGY & ONCOLOGY    Chief Complaint  Breast Cancer    Referring Provider: Miguel Houston MD  PCP: Pat Alfredo APRN    Subjective          Oncology/Hematology History Overview Note     ER+ Right Breast Cancer:    - presented with enlarging palpable right breast mass in January 2023    - Diagnostic mammogram and US: Ultrasound:  A targeted ultrasound of the right breast reveals several small cysts and 0900 hours 5 cm from the nipple.  At the 11 o'clock position there is an approximately 1.9 by 1.5 1.7 cm irregular somewhat hypoechoic mass that looks like it is taller than wide. This could account for the architectural distortion.  On evaluation of the right axilla there is a prominent lymph node measuring approximately 2 x 1.5 x 1 cm with eccentric prominent cortex.    - US guided right breast biopsy and SNL biopsy:  right breast, 11:00 position biopsy positive for invasive ductal carcinoma, G2, 13mm, ER+ (95%), VT+ (70%), HER2- (1+), Ki67 40%. Right axilla lymphoid tissue present, negative for invasive carcinoma    -Right Mastectomy and SLNB on 2/27/23 (Dr. Miguel Houston): invasive ductal carcinoma, G3, 1.8 cm, no PNI, no LVI, no carcinoma in situ, margins negative but superior margin close (<1mm), ER+ (90%), VT+ (60%), HER2 negative by FISH, Ki67 80%    - bone scan on 3/16/23: negative for metastatic disease      Malignant neoplasm of right breast, estrogen receptor positive   2/27/2023 Cancer Staged    Staging form: Breast, AJCC 8th Edition  - Pathologic stage from 2/27/2023: Stage IA (pT1c, pN0(sn), cM0, G3, ER+, VT+, HER2-, Oncotype DX score: 28) - Signed by Janette Pepe MD PhD on 4/6/2023 4/6/2023 Initial Diagnosis    Malignant neoplasm of right breast, estrogen receptor positive         HPI  Patient comes in today to follow-up after her breast surgery and breast cancer diagnosis.  This was done at an outside hospital.  I  reviewed pathology and results from Oncotype testing.  Unfortunately her Oncotype score is 28 so I recommended chemotherapy to reduce the risk of breast cancer recurrence.  I discussed the importance of this as well as the possible risks.  Unfortunately, the patient was also diagnosed with osteomyelitis of the left leg in December 2022.  She reports not having a final surgery due to her breast cancer diagnosis.  I reviewed records from eye doctor at [facility] and plastic surgery.  Notes confirmed they canceled all follow-up appointments when she had a breast cancer diagnosis.  The patient believes she still has active osteomyelitis in that leg.  She has a history of a below the knee amputation as a result of an injury while running marathons.    She tells me that the palpable mass was present for a long period of time before the diagnosis.  She believes it was stable in size for more than a year and then suddenly increased rapidly in size.  This is when she brought it to the attention of her doctor.    She is having great deal of anxiety dealing with her new diagnosis.  She tells me that she is already on 5 psychiatric medications.  She does not see a psychiatrist or therapist at this time.  She is very worried about metastatic disease.  Her reasoning for this is because she is losing weight and has a very poor appetite.  She also frequently gets nauseous.  She has a history of an ulcer in her stomach but has not had an EGD in several years.    Finally, she is concerned about swelling in her right breast after surgery.  She has not seen anyone and lymphedema clinic but does plan to return to her surgeon to see if fluid needs to be removed.    Review of Systems   Constitutional: Positive for appetite change (decreased). Negative for diaphoresis, fatigue, fever, unexpected weight gain and unexpected weight loss.   HENT: Negative for hearing loss, mouth sores, sore throat, swollen glands, trouble swallowing  and voice change.    Eyes: Negative for blurred vision.   Respiratory: Negative for cough, shortness of breath and wheezing.    Cardiovascular: Negative for chest pain and palpitations.   Gastrointestinal: Positive for nausea and vomiting. Negative for abdominal pain, blood in stool, constipation and diarrhea.   Endocrine: Negative for cold intolerance and heat intolerance.   Genitourinary: Positive for breast pain (RT). Negative for difficulty urinating, dysuria, frequency, hematuria and urinary incontinence.   Musculoskeletal: Positive for gait problem (LT nub pain). Negative for arthralgias, back pain and myalgias.   Skin: Negative for rash, skin lesions and wound.   Neurological: Positive for headache. Negative for dizziness, seizures, weakness and numbness.   Hematological: Does not bruise/bleed easily.   Psychiatric/Behavioral: Negative for depressed mood. The patient is not nervous/anxious.    All other systems reviewed and are negative.      Past Medical History:   Diagnosis Date   • Breast cancer    • Lupus      Past Surgical History:   Procedure Laterality Date   • BELOW KNEE LEG AMPUTATION Left    • CARPAL TUNNEL RELEASE Bilateral    • MASTECTOMY Right     also 3 RT breast nodules removed     Social History     Socioeconomic History   • Marital status:    Tobacco Use   • Smoking status: Every Day     Packs/day: 1.00     Types: Cigarettes   Vaping Use   • Vaping Use: Never used   Substance and Sexual Activity   • Alcohol use: Not Currently   • Drug use: Never   • Sexual activity: Defer     Family History   Problem Relation Age of Onset   • Pancreatic cancer Mother    • Diabetes Mother    • Stroke Mother    • Stroke Father    • Heart attack Father    • Heart failure Father    • Stroke Sister        Objective   Physical Exam  General: Alert, cooperative, no acute distress  Right breast: Well-healed surgical incision site across the right chest, no masses or adenopathy  Eyes: Anicteric sclera,  "PERRLA  Respiratory: normal respiratory effort  Cardiovascular: no lower extremity edema  Skin: Normal tone, no rash, no lesions  Psychiatric: Appropriate affect, intact judgment, appears anxious  Neurologic: No focal sensory or motor deficits, normal cognition   Musculoskeletal: Normal muscle strength and tone  Extremities: No clubbing, cyanosis, or deformities    Vitals:    04/06/23 1342   BP: 111/59   Pulse: 71   Resp: 16   Temp: 97.8 °F (36.6 °C)   SpO2: 99%   Weight: 76.4 kg (168 lb 6.9 oz)  Comment: Prostetic leg   Height: 160 cm (63\")   PainSc:   5   PainLoc: Breast  Comment: RT breast     ECOG score: 0         PHQ-9 Total Score:         Result Review :   The following data was reviewed by: Janette Pepe MD PhD on 04/06/2023:  Lab Results   Component Value Date    HGB 13.2 04/06/2023    HCT 39.5 04/06/2023    MCV 91.4 04/06/2023     (H) 04/06/2023    WBC 9.69 04/06/2023    NEUTROABS 5.15 04/06/2023    LYMPHSABS 3.36 (H) 04/06/2023    MONOSABS 0.85 04/06/2023    EOSABS 0.23 04/06/2023    BASOSABS 0.07 04/06/2023     Lab Results   Component Value Date    GLUCOSE 66 04/06/2023    BUN 13 04/06/2023    CREATININE 0.93 04/06/2023     04/06/2023    K 3.8 04/06/2023     04/06/2023    CO2 26.0 04/06/2023    CALCIUM 8.9 04/06/2023    PROTEINTOT 6.9 04/06/2023    ALBUMIN 4.2 04/06/2023    BILITOT <0.2 04/06/2023    ALKPHOS 71 04/06/2023    AST 27 04/06/2023    ALT 23 04/06/2023          Assessment and Plan    Diagnoses and all orders for this visit:    1. Malignant neoplasm of right breast in female, estrogen receptor positive, unspecified site of breast (Primary)  -     Ambulatory Referral to ONC Social Work  -     Ambulatory Referral to Lymphedema Clinic  -     CT chest w contrast; Future  -     CT abdomen pelvis w contrast; Future  -     CBC & Differential; Future  -     Comprehensive Metabolic Panel; Future    2. Weight loss, unintentional  -     CT chest w contrast; Future  -     CT abdomen " pelvis w contrast; Future  -     Ambulatory Referral to Gastroenterology    3. Nausea and vomiting, unspecified vomiting type  -     Ambulatory Referral to Gastroenterology    4. Depression, unspecified depression type    5. History of below-knee amputation of left lower extremity    6. Osteomyelitis of left tibia, unspecified type        ER+ Right Breast Cancer: s/p right mastectomy.  Oncotype score is 28 so chemotherapy was recommended with docetaxel and cyclophosphamide. I will hold this while she is evaluated and treated for osteomyelitis. However, it is important to start within 3 months of surgery which was Feb 27th 2023.  When she does start chemo I will make sure her proper weight is recorded without her prosthetic leg.  I will also send her for genetic testing.  Following chemotherapy she would also be a good candidate for adjuvant abemaciclib.  I briefly discussed this with the patient as well. She will follow up with me after staging CT scans.     Unintended Weight loss: I will order CT CAP with contrast for staging.  She had a negative bone scan at the OSH.     Lymphedema: of the right chest wall after surgery. I will refer to lymphedema clinic.     Left BKA with osteomyelitis: I will hold chemotherapy and recommend the patient see the plastic surgeon who was planning to operate. Any osteomyelitis should be fully treated before starting chemotherapy.      Depression: The patient is currently on multiple psychiatric medications.  I will refer her to behavioral health for evaluation and management.    Hemoptysis and Nausea: I will refer to GI for evaluation.       I spent 80 minutes caring for Coby on this date of service. This time includes time spent by me in the following activities: preparing for the visit, reviewing tests, obtaining and/or reviewing a separately obtained history, performing a medically appropriate examination and/or evaluation, counseling and educating the patient/family/caregiver,  ordering medications, tests, or procedures, referring and communicating with other health care professionals and documenting information in the medical record    Patient was given instructions and counseling regarding her condition or for health maintenance advice. Please see specific information pulled into the AVS if appropriate.

## 2023-04-07 ENCOUNTER — PATIENT ROUNDING (BHMG ONLY) (OUTPATIENT)
Dept: ONCOLOGY | Facility: HOSPITAL | Age: 48
End: 2023-04-07
Payer: MEDICARE

## 2023-04-07 ENCOUNTER — PATIENT OUTREACH (OUTPATIENT)
Dept: ONCOLOGY | Facility: HOSPITAL | Age: 48
End: 2023-04-07
Payer: MEDICARE

## 2023-04-07 NOTE — PROGRESS NOTES
April 7, 2023    Hello, may I speak with Coby Taylor?    My name is Ellen.    I am  with [facility]  [address] [place] [address] [place] [address] [phone].    Before we get started may I verify your date of birth? 1975    I am calling to officially welcome you to our practice and ask about your recent visit. Is this a good time to talk? YES    Tell me about your visit with us. What things went well?      I spoke to the patient and she stated that the visit went well. She did not have any questions or concerns at this time. I verified that she did have our phone number in case she had questions or concerns in the future.     We're always looking for ways to make our patients' experiences even better. Do you have recommendations on ways we may improve?  NO    Overall were you satisfied with your first visit to our practice? YES       I appreciate you taking the time to speak with me today. Is there anything else I can do for you? No- not at this time.      Thank you, and have a great day.

## 2023-04-12 ENCOUNTER — HOSPITAL ENCOUNTER (OUTPATIENT)
Dept: CT IMAGING | Facility: HOSPITAL | Age: 48
Discharge: HOME | End: 2023-04-12
Admitting: INTERNAL MEDICINE
Payer: MEDICARE

## 2023-04-12 DIAGNOSIS — R63.4 WEIGHT LOSS, UNINTENTIONAL: ICD-10-CM

## 2023-04-12 DIAGNOSIS — Z17.0 MALIGNANT NEOPLASM OF RIGHT BREAST IN FEMALE, ESTROGEN RECEPTOR POSITIVE, UNSPECIFIED SITE OF BREAST: ICD-10-CM

## 2023-04-12 DIAGNOSIS — C50.911 MALIGNANT NEOPLASM OF RIGHT BREAST IN FEMALE, ESTROGEN RECEPTOR POSITIVE, UNSPECIFIED SITE OF BREAST: ICD-10-CM

## 2023-04-12 PROCEDURE — 74177 CT ABD & PELVIS W/CONTRAST: CPT

## 2023-04-12 PROCEDURE — 25510000001 IOPAMIDOL PER 1 ML: Performed by: INTERNAL MEDICINE

## 2023-04-12 PROCEDURE — 71260 CT THORAX DX C+: CPT

## 2023-04-12 RX ADMIN — IOPAMIDOL 100 ML: 755 INJECTION, SOLUTION INTRAVENOUS at 19:51

## 2023-04-13 ENCOUNTER — PATIENT OUTREACH (OUTPATIENT)
Dept: ONCOLOGY | Facility: HOSPITAL | Age: 48
End: 2023-04-13
Payer: MEDICARE

## 2023-04-14 ENCOUNTER — OFFICE VISIT (OUTPATIENT)
Dept: ONCOLOGY | Facility: HOSPITAL | Age: 48
End: 2023-04-14
Payer: MEDICARE

## 2023-04-14 VITALS
RESPIRATION RATE: 16 BRPM | OXYGEN SATURATION: 100 % | HEART RATE: 98 BPM | WEIGHT: 159.83 LBS | SYSTOLIC BLOOD PRESSURE: 123 MMHG | TEMPERATURE: 97.7 F | BODY MASS INDEX: 28.32 KG/M2 | HEIGHT: 63 IN | DIASTOLIC BLOOD PRESSURE: 78 MMHG

## 2023-04-14 DIAGNOSIS — K21.9 GASTROESOPHAGEAL REFLUX DISEASE WITHOUT ESOPHAGITIS: ICD-10-CM

## 2023-04-14 DIAGNOSIS — C50.911 MALIGNANT NEOPLASM OF RIGHT BREAST IN FEMALE, ESTROGEN RECEPTOR POSITIVE, UNSPECIFIED SITE OF BREAST: ICD-10-CM

## 2023-04-14 DIAGNOSIS — N94.9 ADNEXAL CYST: ICD-10-CM

## 2023-04-14 DIAGNOSIS — I87.8 POOR VENOUS ACCESS: ICD-10-CM

## 2023-04-14 DIAGNOSIS — M86.9 OSTEOMYELITIS OF LEFT TIBIA, UNSPECIFIED TYPE: Primary | ICD-10-CM

## 2023-04-14 DIAGNOSIS — Z17.0 MALIGNANT NEOPLASM OF RIGHT BREAST IN FEMALE, ESTROGEN RECEPTOR POSITIVE, UNSPECIFIED SITE OF BREAST: ICD-10-CM

## 2023-04-14 PROBLEM — Z90.10 HISTORY OF MASTECTOMY: Status: ACTIVE | Noted: 2023-03-29

## 2023-04-14 PROCEDURE — G0463 HOSPITAL OUTPT CLINIC VISIT: HCPCS | Performed by: INTERNAL MEDICINE

## 2023-04-14 RX ORDER — GABAPENTIN 400 MG/1
CAPSULE ORAL EVERY 6 HOURS SCHEDULED
COMMUNITY

## 2023-04-14 RX ORDER — BUPROPION HYDROCHLORIDE 150 MG/1
TABLET, EXTENDED RELEASE ORAL
COMMUNITY

## 2023-04-14 RX ORDER — SCOLOPAMINE TRANSDERMAL SYSTEM 1 MG/1
1 PATCH, EXTENDED RELEASE TRANSDERMAL
Qty: 10 PATCH | Refills: 1 | Status: SHIPPED | OUTPATIENT
Start: 2023-04-14

## 2023-04-14 RX ORDER — ONDANSETRON 8 MG/1
TABLET, ORALLY DISINTEGRATING ORAL
COMMUNITY
Start: 2023-04-07 | End: 2023-04-14 | Stop reason: SDUPTHER

## 2023-04-14 RX ORDER — AMOXICILLIN 250 MG
2 CAPSULE ORAL EVERY 12 HOURS SCHEDULED
Qty: 120 TABLET | Refills: 11 | COMMUNITY
Start: 2022-04-26 | End: 2023-04-27

## 2023-04-14 RX ORDER — ONDANSETRON 8 MG/1
8 TABLET, ORALLY DISINTEGRATING ORAL EVERY 8 HOURS PRN
Qty: 90 TABLET | Refills: 1 | Status: SHIPPED | OUTPATIENT
Start: 2023-04-14

## 2023-04-14 RX ORDER — DIPHENHYDRAMINE HYDROCHLORIDE, ZINC ACETATE 2; .1 G/100G; G/100G
1 CREAM TOPICAL
COMMUNITY

## 2023-04-14 RX ORDER — VARENICLINE TARTRATE 0.5 MG/1
TABLET, FILM COATED ORAL
COMMUNITY

## 2023-04-14 RX ORDER — METOCLOPRAMIDE 10 MG/1
10 TABLET ORAL
Qty: 120 TABLET | Refills: 0 | Status: CANCELLED | OUTPATIENT
Start: 2023-04-14

## 2023-04-14 NOTE — PROGRESS NOTES
Patient  Coby Taylor    Location  [facility] HEMATOLOGY & ONCOLOGY    Chief Complaint  Breast Cancer    Referring Provider: LOUISA Romero  PCP: Pat Alfredo APRN    Subjective          Oncology/Hematology History Overview Note     ER+ Right Breast Cancer:    - presented with enlarging palpable right breast mass in January 2023    - Diagnostic mammogram and US: Ultrasound:  A targeted ultrasound of the right breast reveals several small cysts and 0900 hours 5 cm from the nipple.  At the 11 o'clock position there is an approximately 1.9 by 1.5 1.7 cm irregular somewhat hypoechoic mass that looks like it is taller than wide. This could account for the architectural distortion.  On evaluation of the right axilla there is a prominent lymph node measuring approximately 2 x 1.5 x 1 cm with eccentric prominent cortex.    - US guided right breast biopsy and SNL biopsy:  right breast, 11:00 position biopsy positive for invasive ductal carcinoma, G2, 13mm, ER+ (95%), IA+ (70%), HER2- (1+), Ki67 40%. Right axilla lymphoid tissue present, negative for invasive carcinoma    -Right Mastectomy and SLNB on 2/27/23 (Dr. Miguel Houston): invasive ductal carcinoma, G3, 1.8 cm, no PNI, no LVI, no carcinoma in situ, margins negative but superior margin close (<1mm), ER+ (90%), IA+ (60%), HER2 negative by FISH, Ki67 80%    - bone scan on 3/16/23: negative for metastatic disease      Malignant neoplasm of right breast, estrogen receptor positive   2/27/2023 Cancer Staged    Staging form: Breast, AJCC 8th Edition  - Pathologic stage from 2/27/2023: Stage IA (pT1c, pN0(sn), cM0, G3, ER+, IA+, HER2-, Oncotype DX score: 28) - Signed by Janette Pepe MD PhD on 4/6/2023 4/6/2023 Initial Diagnosis    Malignant neoplasm of right breast, estrogen receptor positive         HPI  Patient comes in today for follow-up.  She had a recent CT CAP with contrast on 4/12/2023.  This showed typical postsurgical changes  from right mastectomy and a possible partially calcified 2 mm nodule in the right upper lobe that is nonspecific and may be granulomatous.  She also has a cystic structure in the right adnexa.  She has not yet been seen by the plastic surgeon, Dr. Calvo at [facility].  I explained to her that this is urgent so that we can get the osteomyelitis taking care of and she can start chemotherapy within a reasonable time.  Alternatively, she may be a candidate for endocrine therapy with adjuvant abemaciclib.    Review of Systems   Constitutional: Positive for fatigue (8/10). Negative for appetite change, diaphoresis, fever, unexpected weight gain and unexpected weight loss.   HENT: Negative for hearing loss, mouth sores, sore throat, swollen glands, trouble swallowing and voice change.    Eyes: Negative for blurred vision.   Respiratory: Negative for cough, shortness of breath and wheezing.    Cardiovascular: Negative for chest pain and palpitations.   Gastrointestinal: Positive for nausea and vomiting. Negative for abdominal pain, blood in stool, constipation and diarrhea.   Endocrine: Negative for cold intolerance and heat intolerance.   Genitourinary: Positive for breast pain (8/10 RT breast). Negative for difficulty urinating, dysuria, frequency, hematuria and urinary incontinence.   Musculoskeletal: Positive for joint swelling (RT knee swelling). Negative for arthralgias, back pain and myalgias.   Skin: Negative for rash, skin lesions and wound.   Neurological: Negative for dizziness, seizures, weakness, numbness and headache.   Hematological: Does not bruise/bleed easily.   Psychiatric/Behavioral: Negative for depressed mood. The patient is not nervous/anxious.    All other systems reviewed and are negative.      Past Medical History:   Diagnosis Date   • Breast cancer    • Lupus      Past Surgical History:   Procedure Laterality Date   • BELOW KNEE LEG AMPUTATION Left    • CARPAL TUNNEL RELEASE Bilateral    •  "MASTECTOMY Right     also 3 RT breast nodules removed     Social History     Socioeconomic History   • Marital status:    Tobacco Use   • Smoking status: Every Day     Packs/day: 1.00     Types: Cigarettes   Vaping Use   • Vaping Use: Never used   Substance and Sexual Activity   • Alcohol use: Not Currently   • Drug use: Never   • Sexual activity: Defer     Family History   Problem Relation Age of Onset   • Pancreatic cancer Mother    • Diabetes Mother    • Stroke Mother    • Stroke Father    • Heart attack Father    • Heart failure Father    • Stroke Sister        Objective   Physical Exam  General: Alert, cooperative, no acute distress  Eyes: Anicteric sclera, PERRLA  Respiratory: normal respiratory effort  Cardiovascular: no lower extremity edema  Skin: Normal tone, no rash, no lesions  Psychiatric: Appropriate affect, intact judgment  Neurologic: No focal sensory or motor deficits, normal cognition   Musculoskeletal: Normal muscle strength and tone  Extremities: No clubbing, cyanosis, or deformities    Vitals:    04/14/23 1312   BP: 123/78   Pulse: 98   Resp: 16   Temp: 97.7 °F (36.5 °C)   SpO2: 100%   Weight: 72.5 kg (159 lb 13.3 oz)  Comment: Prostetic LLL   Height: 160 cm (62.99\")   PainSc:   8   PainLoc: Breast  Comment: RT breast     ECOG score: 0         PHQ-9 Total Score:         Result Review :   The following data was reviewed by: Janette Pepe MD PhD on 04/14/2023:  Lab Results   Component Value Date    HGB 13.2 04/06/2023    HCT 39.5 04/06/2023    MCV 91.4 04/06/2023     (H) 04/06/2023    WBC 9.69 04/06/2023    NEUTROABS 5.15 04/06/2023    LYMPHSABS 3.36 (H) 04/06/2023    MONOSABS 0.85 04/06/2023    EOSABS 0.23 04/06/2023    BASOSABS 0.07 04/06/2023     Lab Results   Component Value Date    GLUCOSE 66 04/06/2023    BUN 13 04/06/2023    CREATININE 0.93 04/06/2023     04/06/2023    K 3.8 04/06/2023     04/06/2023    CO2 26.0 04/06/2023    CALCIUM 8.9 04/06/2023    " PROTEINTOT 6.9 04/06/2023    ALBUMIN 4.2 04/06/2023    BILITOT <0.2 04/06/2023    ALKPHOS 71 04/06/2023    AST 27 04/06/2023    ALT 23 04/06/2023          Assessment and Plan    Diagnoses and all orders for this visit:    1. Osteomyelitis of left tibia, unspecified type (Primary)    2. Malignant neoplasm of right breast in female, estrogen receptor positive, unspecified site of breast  -     Ambulatory Referral to General Surgery    3. Gastroesophageal reflux disease without esophagitis    4. Adnexal cyst    5. Poor venous access    Other orders  -     ondansetron ODT (ZOFRAN-ODT) 8 MG disintegrating tablet; Place 1 tablet on the tongue Every 8 (Eight) Hours As Needed for Nausea or Vomiting.  Dispense: 90 tablet; Refill: 1  -     Scopolamine 1 MG/3DAYS patch; Place 1 patch on the skin as directed by provider Every 72 (Seventy-Two) Hours.  Dispense: 10 patch; Refill: 1        ER+ Right Breast Cancer: s/p right mastectomy.  Oncotype score is 28 so chemotherapy was recommended with docetaxel and cyclophosphamide. I will hold this while she is evaluated and treated for osteomyelitis. However, it is important to start within 3 months of surgery which was Feb 27th 2023.  She will likely not start by this deadline.  However, if she does start chemo I will make sure her proper weight is recorded without her prosthetic leg.  Following chemotherapy she would also be a good candidate for adjuvant abemaciclib.  This could also be given as an alternative to chemotherapy although not recommended as replacement.  Genetic testing is pending.  I will follow-up with the patient in 2 weeks.    Unintended Weight loss: CT CAP with contrast and bone scan showed no evidence of metastatic disease.  However she does have some right adnexal cyst.  Patient says she has a gynecologist and will discuss vaginal ultrasound with them.    Poor venous access: The patient will need a port if she is to get chemotherapy or long-term antibiotics for her  reported history of osteomyelitis.  I will refer the patient to general surgery for this.    Left BKA with osteomyelitis: I will hold chemotherapy and recommend the patient see the plastic surgeon who was planning to operate. Any osteomyelitis should be fully treated before starting chemotherapy.      Chronic nausea: Following long-term treatment with antibiotics.  The patient cannot take metoclopramide due to interactions with other medications.  Zofran helps but does not alleviate her symptoms.  I will send a prescription for scopolamine to see if that will help.  She has an appointment with gastroenterology on 5/11/2023 for history of hemoptysis so she can discuss this issue as well.    I spent 40 minutes caring for Coby on this date of service. This time includes time spent by me in the following activities: preparing for the visit, reviewing tests, performing a medically appropriate examination and/or evaluation, counseling and educating the patient/family/caregiver and referring and communicating with other health care professionals

## 2023-04-25 ENCOUNTER — TELEPHONE (OUTPATIENT)
Dept: ONCOLOGY | Facility: HOSPITAL | Age: 48
End: 2023-04-25

## 2023-04-25 NOTE — TELEPHONE ENCOUNTER
Caller: Coby Taylor    Relationship to patient: Self    Best call back number: [phone]    Chief complaint: PT NEEDS 04/27/23 APPT CHANGED DUE TO HAVING A CONFLICTING  APPT.    Type of visit: FOLLOW UP 2    **HUB UNABLE TO SCHEDULE    Requested date: MAY 3RD OR 4TH IF AVAILAVLE    If rescheduling, when is the original appointment: 04/27/23

## 2023-05-15 PROBLEM — N94.9 ADNEXAL CYST: Status: ACTIVE | Noted: 2023-05-15

## 2023-05-15 PROBLEM — I87.8 POOR VENOUS ACCESS: Status: ACTIVE | Noted: 2023-05-15

## 2023-06-30 ENCOUNTER — TELEPHONE (OUTPATIENT)
Dept: ONCOLOGY | Facility: HOSPITAL | Age: 48
End: 2023-06-30

## 2023-06-30 NOTE — TELEPHONE ENCOUNTER
Caller: Leidy López    Relationship: Emergency Contact    Best call back number: [phone]    What is the best time to reach you: ANYTIME    Who are you requesting to speak with (clinical staff, provider, specific staff member): SCHEDULING    What was the call regarding: PLEASE CALL LEIDY TO SCHEDULE PATIENT A F/U AFTER 7/3.

## 2023-07-07 ENCOUNTER — OFFICE VISIT (OUTPATIENT)
Dept: ONCOLOGY | Facility: HOSPITAL | Age: 48
End: 2023-07-07
Payer: MEDICARE

## 2023-07-07 VITALS
SYSTOLIC BLOOD PRESSURE: 113 MMHG | RESPIRATION RATE: 16 BRPM | HEIGHT: 63 IN | BODY MASS INDEX: 29.34 KG/M2 | HEART RATE: 71 BPM | DIASTOLIC BLOOD PRESSURE: 75 MMHG | WEIGHT: 165.57 LBS | OXYGEN SATURATION: 99 % | TEMPERATURE: 97.5 F

## 2023-07-07 DIAGNOSIS — Z17.0 MALIGNANT NEOPLASM OF RIGHT BREAST IN FEMALE, ESTROGEN RECEPTOR POSITIVE, UNSPECIFIED SITE OF BREAST: Primary | ICD-10-CM

## 2023-07-07 DIAGNOSIS — Z78.0 POSTMENOPAUSAL: ICD-10-CM

## 2023-07-07 DIAGNOSIS — C50.911 MALIGNANT NEOPLASM OF RIGHT BREAST IN FEMALE, ESTROGEN RECEPTOR POSITIVE, UNSPECIFIED SITE OF BREAST: Primary | ICD-10-CM

## 2023-07-07 PROBLEM — G54.6 PHANTOM PAIN FOLLOWING AMPUTATION OF LOWER LIMB: Status: ACTIVE | Noted: 2023-05-31

## 2023-07-07 PROBLEM — R63.4 UNEXPLAINED WEIGHT LOSS: Status: ACTIVE | Noted: 2023-05-10

## 2023-07-07 PROCEDURE — G0463 HOSPITAL OUTPT CLINIC VISIT: HCPCS | Performed by: INTERNAL MEDICINE

## 2023-07-07 RX ORDER — HYDROXYZINE HYDROCHLORIDE 10 MG/1
1 TABLET, FILM COATED ORAL EVERY 8 HOURS PRN
COMMUNITY

## 2023-07-07 RX ORDER — BUSPIRONE HYDROCHLORIDE 10 MG/1
1 TABLET ORAL 2 TIMES DAILY
COMMUNITY

## 2023-07-07 RX ORDER — FLUOCINONIDE TOPICAL SOLUTION USP, 0.05% 0.5 MG/ML
SOLUTION TOPICAL
COMMUNITY

## 2023-07-07 RX ORDER — ALPRAZOLAM 0.5 MG/1
TABLET ORAL
COMMUNITY

## 2023-07-07 RX ORDER — METRONIDAZOLE 250 MG/1
TABLET ORAL
COMMUNITY

## 2023-07-07 RX ORDER — DOXYCYCLINE HYCLATE 100 MG/1
1 CAPSULE ORAL DAILY
COMMUNITY

## 2023-07-07 NOTE — PROGRESS NOTES
Patient  Coby Taylor    Location  [facility] HEMATOLOGY & ONCOLOGY    Chief Complaint  Breast Cancer    Referring Provider: LOUISA Romero  PCP: Pat Alfredo APRN    Subjective     {Problem List  Visit Diagnosis   Encounters  Notes  Medications  Labs  Result Review Imaging  Media :23}     Oncology/Hematology History Overview Note     ER+ Right Breast Cancer:    - presented with enlarging palpable right breast mass in January 2023    - Diagnostic mammogram and US: Ultrasound:  A targeted ultrasound of the right breast reveals several small cysts and 0900 hours 5 cm from the nipple.  At the 11 o'clock position there is an approximately 1.9 by 1.5 1.7 cm irregular somewhat hypoechoic mass that looks like it is taller than wide. This could account for the architectural distortion.  On evaluation of the right axilla there is a prominent lymph node measuring approximately 2 x 1.5 x 1 cm with eccentric prominent cortex.    - US guided right breast biopsy and SNL biopsy:  right breast, 11:00 position biopsy positive for invasive ductal carcinoma, G2, 13mm, ER+ (95%), AK+ (70%), HER2- (1+), Ki67 40%. Right axilla lymphoid tissue present, negative for invasive carcinoma    -Right Mastectomy and SLNB on 2/27/23 (Dr. Miguel Houston): invasive ductal carcinoma, G3, 1.8 cm, no PNI, no LVI, no carcinoma in situ, margins negative but superior margin close (<1mm), ER+ (90%), AK+ (60%), HER2 negative by FISH, Ki67 80%    - bone scan on 3/16/23: negative for metastatic disease      Malignant neoplasm of right breast, estrogen receptor positive   2/27/2023 Cancer Staged    Staging form: Breast, AJCC 8th Edition  - Pathologic stage from 2/27/2023: Stage IA (pT1c, pN0(sn), cM0, G3, ER+, AK+, HER2-, Oncotype DX score: 28) - Signed by Janette Peep MD PhD on 4/6/2023 4/6/2023 Initial Diagnosis    Malignant neoplasm of right breast, estrogen receptor positive         History of Present  Illness  ***    Review of Systems   Constitutional:  Positive for fatigue (10/10). Negative for appetite change, diaphoresis, fever, unexpected weight gain and unexpected weight loss.   HENT:  Negative for hearing loss, mouth sores, sore throat, swollen glands, trouble swallowing and voice change.    Eyes:  Negative for blurred vision.   Respiratory:  Negative for cough, shortness of breath and wheezing.    Cardiovascular:  Negative for chest pain and palpitations.   Gastrointestinal:  Negative for abdominal pain, blood in stool, constipation, diarrhea, nausea and vomiting.   Endocrine: Negative for cold intolerance and heat intolerance.   Genitourinary:  Negative for difficulty urinating, dysuria, frequency, hematuria and urinary incontinence.   Musculoskeletal:  Negative for arthralgias, back pain and myalgias.   Skin:  Negative for rash, skin lesions and wound.   Neurological:  Negative for dizziness, seizures, weakness, numbness and headache.   Hematological:  Does not bruise/bleed easily.   Psychiatric/Behavioral:  Negative for depressed mood. The patient is not nervous/anxious.      Past Medical History:   Diagnosis Date    Breast cancer     Lupus      Past Surgical History:   Procedure Laterality Date    BELOW KNEE LEG AMPUTATION Left     CARPAL TUNNEL RELEASE Bilateral     MASTECTOMY Right     also 3 RT breast nodules removed     Social History     Socioeconomic History    Marital status:    Tobacco Use    Smoking status: Every Day     Packs/day: 1.00     Types: Cigarettes   Vaping Use    Vaping Use: Never used   Substance and Sexual Activity    Alcohol use: Not Currently    Drug use: Never    Sexual activity: Defer     Family History   Problem Relation Age of Onset    Pancreatic cancer Mother     Diabetes Mother     Stroke Mother     Stroke Father     Heart attack Father     Heart failure Father     Stroke Sister        Objective   Physical Exam  ***    There were no vitals filed for this visit.             PHQ-9 Total Score:         Result Review :{Labs  Result Review  Imaging  Med Tab  Media  Procedures :23}   The following data was reviewed by: Erika Ibrahim MA on 07/07/2023:  Lab Results   Component Value Date    HGB 13.2 04/06/2023    HCT 39.5 04/06/2023    MCV 91.4 04/06/2023     (H) 04/06/2023    WBC 9.69 04/06/2023    NEUTROABS 5.15 04/06/2023    LYMPHSABS 3.36 (H) 04/06/2023    MONOSABS 0.85 04/06/2023    EOSABS 0.23 04/06/2023    BASOSABS 0.07 04/06/2023     Lab Results   Component Value Date    GLUCOSE 66 04/06/2023    BUN 13 04/06/2023    CREATININE 0.93 04/06/2023     04/06/2023    K 3.8 04/06/2023     04/06/2023    CO2 26.0 04/06/2023    CALCIUM 8.9 04/06/2023    PROTEINTOT 6.9 04/06/2023    ALBUMIN 4.2 04/06/2023    BILITOT <0.2 04/06/2023    ALKPHOS 71 04/06/2023    AST 27 04/06/2023    ALT 23 04/06/2023     No results found for: IRON, LABIRON, TRANSFERRIN, TIBC  No results found for: LDH, FERRITIN, [id number], FOLATE  No results found for: PSA, CEA, AFP, ,        Assessment and Plan {CC Problem List  Visit Diagnosis   ROS  Review (Popup)  Health Maintenance  Quality  BestPractice  Medications  SmartSets  SnapShot Encounters  Media :23}   Diagnoses and all orders for this visit:    1. Malignant neoplasm of right breast in female, estrogen receptor positive, unspecified site of breast [C50.911, Z17.0 (ICD-10-CM)] (Primary)          Patient was given instructions and counseling regarding her condition or for health maintenance advice. Please see specific information pulled into the AVS if appropriate.     Erika Ibrahim MA    7/7/2023            MONOSABS 0.85 04/06/2023    EOSABS 0.23 04/06/2023    BASOSABS 0.07 04/06/2023     Lab Results   Component Value Date    GLUCOSE 66 04/06/2023    BUN 13 04/06/2023    CREATININE 0.93 04/06/2023     04/06/2023    K 3.8 04/06/2023     04/06/2023    CO2 26.0 04/06/2023    CALCIUM 8.9 04/06/2023    PROTEINTOT 6.9 04/06/2023    ALBUMIN 4.2 04/06/2023    BILITOT <0.2 04/06/2023    ALKPHOS 71 04/06/2023    AST 27 04/06/2023    ALT 23 04/06/2023     No results found for: IRON, LABIRON, TRANSFERRIN, TIBC  No results found for: LDH, FERRITIN, [id number], FOLATE  No results found for: PSA, CEA, AFP, ,        Assessment and Plan    Diagnoses and all orders for this visit:    1. Malignant neoplasm of right breast in female, estrogen receptor positive, unspecified site of breast [C50.911, Z17.0 (ICD-10-CM)] (Primary)    2. Postmenopausal  -     Follicle Stimulating Hormone; Future  -     Estradiol; Future      ER+ Right Breast Cancer: s/p right mastectomy.  Oncotype score is 28 so chemotherapy was recommended but there was significant delay due to the fact that the patient was thought to have chronic osteomyelitis. At this point I recommend ovarian suppression with Lupron plus an AI as well as adjuvant abemaciclib. I will check FSH/E2 today and the pt will follow up in two weeks.      Of note, BRCA testing was negative but I do not see results from the full panel.            Patient was given instructions and counseling regarding her condition or for health maintenance advice. Please see specific information pulled into the AVS if appropriate.     Janette Pepe MD PhD    8/10/2023

## 2023-07-27 ENCOUNTER — TELEPHONE (OUTPATIENT)
Dept: ONCOLOGY | Facility: HOSPITAL | Age: 48
End: 2023-07-27
Payer: MEDICARE

## 2023-07-27 NOTE — TELEPHONE ENCOUNTER
LEFT MESSAGE FOR PATIENT, TRYING TO RESCHEDULE APPOINTMENTS FROM 07/21.  ASKED PATIENT TO RETURN THE CALL TO THE OFFICE TO RESCHEDULE.

## 2023-08-14 ENCOUNTER — TELEPHONE (OUTPATIENT)
Dept: ONCOLOGY | Facility: HOSPITAL | Age: 48
End: 2023-08-14

## 2023-08-14 NOTE — TELEPHONE ENCOUNTER
Caller: Coby Taylor    Relationship to patient: Self    Best call back number: [phone]    Chief complaint: NEEDING TO RESCHEDULE     Type of visit: FOLLOW UP AND INJECTION    Requested date: ANYTIME ASAP    IF CAN NEED AROUND LATE AFTERNOON 2PM OR AFTER     If rescheduling, when is the original appointment: 07/21/23    Additional notes:

## 2023-08-16 ENCOUNTER — PATIENT OUTREACH (OUTPATIENT)
Dept: ONCOLOGY | Facility: HOSPITAL | Age: 48
End: 2023-08-16
Payer: MEDICARE

## 2023-08-22 ENCOUNTER — TELEPHONE (OUTPATIENT)
Dept: ONCOLOGY | Facility: HOSPITAL | Age: 48
End: 2023-08-22

## 2023-08-22 NOTE — TELEPHONE ENCOUNTER
Caller: Coby Taylor    Relationship to patient: Self    Best call back number: [phone]     Chief complaint: PT WAS ALREADY LATE TO APPT AND WOULD LIKE TO CANCEL APPT. PT WILL BE HAVING SURGERY ON MONDAY 08/28/23.    Type of visit: LAB, INJECTION, ESTABLISHED/NEW PROVIDER    Requested date: PT STATES THAT SHE WILL CALL TO R/S AFTER HER SURGERY.     If rescheduling, when is the original appointment: 08/22/23    Additional notes: PLEASE CALL PT IF THERE ARE ANY ISSUES OR ANY ADDITIONAL QUESTIONS.

## 2023-09-12 ENCOUNTER — DOCUMENTATION (OUTPATIENT)
Dept: ONCOLOGY | Facility: HOSPITAL | Age: 48
End: 2023-09-12
Payer: MEDICARE

## 2023-09-19 ENCOUNTER — TELEPHONE (OUTPATIENT)
Dept: ONCOLOGY | Facility: HOSPITAL | Age: 48
End: 2023-09-19
Payer: MEDICARE

## 2023-09-19 NOTE — TELEPHONE ENCOUNTER
LEFT MESSAGE FOR PATIENT, NEED TO RESCHEDULE LABS AND INJECTION TO COORDINATE WITH A FOLLOW UP TO ESTABLISH WITH DR. UMANZOR. ASKED FOR PATIENT TO CALL US BACK SO WE CAN RESCHEDULE.

## 2023-09-22 ENCOUNTER — LAB (OUTPATIENT)
Dept: ONCOLOGY | Facility: HOSPITAL | Age: 48
End: 2023-09-22
Payer: MEDICARE

## 2023-09-22 ENCOUNTER — HOSPITAL ENCOUNTER (OUTPATIENT)
Dept: ONCOLOGY | Facility: HOSPITAL | Age: 48
Discharge: HOME | End: 2023-09-22
Payer: MEDICARE

## 2023-09-22 ENCOUNTER — OFFICE VISIT (OUTPATIENT)
Dept: ONCOLOGY | Facility: HOSPITAL | Age: 48
End: 2023-09-22
Payer: MEDICARE

## 2023-09-22 VITALS
TEMPERATURE: 97.3 F | WEIGHT: 161.6 LBS | RESPIRATION RATE: 18 BRPM | OXYGEN SATURATION: 97 % | HEIGHT: 63 IN | SYSTOLIC BLOOD PRESSURE: 114 MMHG | BODY MASS INDEX: 28.63 KG/M2 | DIASTOLIC BLOOD PRESSURE: 78 MMHG | HEART RATE: 65 BPM

## 2023-09-22 DIAGNOSIS — Z17.0 MALIGNANT NEOPLASM OF RIGHT BREAST IN FEMALE, ESTROGEN RECEPTOR POSITIVE, UNSPECIFIED SITE OF BREAST: Primary | ICD-10-CM

## 2023-09-22 DIAGNOSIS — N94.9 ADNEXAL CYST: ICD-10-CM

## 2023-09-22 DIAGNOSIS — C50.911 MALIGNANT NEOPLASM OF RIGHT BREAST IN FEMALE, ESTROGEN RECEPTOR POSITIVE, UNSPECIFIED SITE OF BREAST: Primary | ICD-10-CM

## 2023-09-22 DIAGNOSIS — C50.911 MALIGNANT NEOPLASM OF RIGHT BREAST IN FEMALE, ESTROGEN RECEPTOR POSITIVE, UNSPECIFIED SITE OF BREAST: ICD-10-CM

## 2023-09-22 DIAGNOSIS — Z79.811 AROMATASE INHIBITOR USE: ICD-10-CM

## 2023-09-22 DIAGNOSIS — Z17.0 MALIGNANT NEOPLASM OF RIGHT BREAST IN FEMALE, ESTROGEN RECEPTOR POSITIVE, UNSPECIFIED SITE OF BREAST: ICD-10-CM

## 2023-09-22 PROBLEM — N64.89 SEROMA OF BREAST: Status: ACTIVE | Noted: 2023-08-28

## 2023-09-22 LAB — B-HCG UR QL: NEGATIVE

## 2023-09-22 PROCEDURE — 81025 URINE PREGNANCY TEST: CPT

## 2023-09-22 PROCEDURE — G0463 HOSPITAL OUTPT CLINIC VISIT: HCPCS | Performed by: INTERNAL MEDICINE

## 2023-09-22 PROCEDURE — 96402 CHEMO HORMON ANTINEOPL SQ/IM: CPT

## 2023-09-22 PROCEDURE — 25010000002 LEUPROLIDE PER 3.75 MG: Performed by: INTERNAL MEDICINE

## 2023-09-22 RX ORDER — PREDNISONE 20 MG/1
40 TABLET ORAL DAILY
Qty: 12 TABLET | Refills: 0 | COMMUNITY
Start: 2023-09-18 | End: 2023-09-24

## 2023-09-22 RX ORDER — NALOXONE HYDROCHLORIDE 4 MG/.1ML
1 SPRAY NASAL
COMMUNITY
Start: 2023-04-21

## 2023-09-22 RX ORDER — ANASTROZOLE 1 MG/1
1 TABLET ORAL DAILY
Qty: 30 TABLET | Refills: 0 | Status: SHIPPED | OUTPATIENT
Start: 2023-09-22

## 2023-09-22 RX ORDER — METHOCARBAMOL 500 MG/1
500 TABLET, FILM COATED ORAL
COMMUNITY
Start: 2023-09-18 | End: 2023-09-27

## 2023-09-22 RX ORDER — IBUPROFEN 800 MG/1
800 TABLET ORAL 3 TIMES DAILY
Qty: 33 TABLET | Refills: 0 | COMMUNITY
Start: 2023-09-18 | End: 2023-09-29

## 2023-09-22 RX ADMIN — LEUPROLIDE ACETATE 3.75 MG: KIT at 11:11

## 2023-09-22 NOTE — PROGRESS NOTES
Patient  Coby Taylor    Location  [facility] HEMATOLOGY & ONCOLOGY    Chief Complaint  Est. New Provider - Malignant neoplasm of right breast in f    Referring Provider: LOUISA Romero  PCP: Pat Alfredo APRN    Subjective          Oncology/Hematology History Overview Note     ER+ Right Breast Cancer:    - presented with enlarging palpable right breast mass in January 2023    - Diagnostic mammogram and US: Ultrasound:  A targeted ultrasound of the right breast reveals several small cysts and 0900 hours 5 cm from the nipple.  At the 11 o'clock position there is an approximately 1.9 by 1.5 1.7 cm irregular somewhat hypoechoic mass that looks like it is taller than wide. This could account for the architectural distortion.  On evaluation of the right axilla there is a prominent lymph node measuring approximately 2 x 1.5 x 1 cm with eccentric prominent cortex.    - US guided right breast biopsy and SNL biopsy:  right breast, 11:00 position biopsy positive for invasive ductal carcinoma, G2, 13mm, ER+ (95%), [place]+ (70%), HER2- (1+), Ki67 40%. Right axilla lymphoid tissue present, negative for invasive carcinoma. pT1cN0.    -Right Mastectomy and SLNB on 2/27/23 (Dr. Miguel Houston): invasive ductal carcinoma, G3, 1.8 cm, no PNI, no LVI, no carcinoma in situ, margins negative but superior margin close (<1mm), ER+ (90%), [place]+ (60%), HER2 negative by FISH, Ki67 80%    - Oncotype score was 28.  Chemotherapy was delayed due to reported osteomyelitis.      - bone scan on 3/16/23: negative for metastatic disease .    - Transferred care here in April 2023.    - CT CAP was negative for metastatic disease on 4/12/23.    -  Pt was later cleared for chemotherapy when osteomyelitis was ruled out in July of 2023. Five months passed since surgery so chemotherapy was not given.     - 7/2023: FSH and estradiol levels in pre-menopausal range    - 9/22/23: Start AI with anastrozole plus Lupron. Plan for at least 5  years     Malignant neoplasm of right breast, estrogen receptor positive   2/27/2023 Cancer Staged    Staging form: Breast, AJCC 8th Edition  - Pathologic stage from 2/27/2023: Stage IA (pT1c, pN0(sn), cM0, G3, ER+, NE+, HER2-, Oncotype DX score: 28) - Signed by Janette Pepe MD PhD on 4/6/2023 4/6/2023 Initial Diagnosis    Malignant neoplasm of right breast, estrogen receptor positive     7/21/2023 - 7/21/2023 Chemotherapy    OP SUPPORTIVE Leuprolide 7.5 mg Q28D       9/22/2023 -  Chemotherapy    OP SUPPORTIVE BREAST Leuprolide 3.75 MG Q28D           History of Present Illness  Patient comes in today for follow-up.  She was recently seen for muscle strain in her back. She was placed on muscle relaxer and NSAIDs. She has fatigue more recently and thinks it is from the medications. She does have some pelvic discomfort at times. She also reports rare episodes of her right eye becoming dilated for no reason. It resolves on its own after about 2 hours. She has seen ophthalmology and they recommend ED when symptoms re-present. No new bone pain but does have some joint pain in her left knee. No fevers, chills.     Review of Systems   Constitutional:  Positive for fatigue. Negative for appetite change, diaphoresis, fever, unexpected weight gain and unexpected weight loss.   HENT:  Negative for hearing loss, mouth sores, sore throat, swollen glands, trouble swallowing and voice change.    Eyes:  Negative for blurred vision.   Respiratory:  Negative for cough, shortness of breath and wheezing.    Cardiovascular:  Negative for chest pain and palpitations.   Gastrointestinal:  Negative for abdominal pain, blood in stool, constipation, diarrhea, nausea and vomiting.   Endocrine: Negative for cold intolerance and heat intolerance.   Genitourinary:  Negative for difficulty urinating, dysuria, frequency, hematuria and urinary incontinence.   Musculoskeletal:  Positive for back pain. Negative for arthralgias and myalgias.  "  Skin:  Negative for rash, skin lesions and wound.   Neurological:  Negative for dizziness, seizures, weakness, numbness and headache.   Hematological:  Does not bruise/bleed easily.   Psychiatric/Behavioral:  Negative for depressed mood. The patient is not nervous/anxious.      Past Medical History:   Diagnosis Date    Breast cancer     Lupus      Past Surgical History:   Procedure Laterality Date    BELOW KNEE LEG AMPUTATION Left     CARPAL TUNNEL RELEASE Bilateral     MASTECTOMY Right     also 3 RT breast nodules removed     Social History     Socioeconomic History    Marital status:    Tobacco Use    Smoking status: Every Day     Packs/day: 1.00     Types: Cigarettes   Vaping Use    Vaping Use: Never used   Substance and Sexual Activity    Alcohol use: Not Currently    Drug use: Never    Sexual activity: Defer     Family History   Problem Relation Age of Onset    Pancreatic cancer Mother     Diabetes Mother     Stroke Mother     Stroke Father     Heart attack Father     Heart failure Father     Stroke Sister        Objective   Physical Exam  General: Alert, cooperative, no acute distress  Eyes: Anicteric sclera, PERRLA  Respiratory: normal respiratory effort  Cardiovascular: no lower extremity edema  Skin: Normal tone, no rash, no lesions  Psychiatric: Appropriate affect, intact judgment  Neurologic: No focal sensory or motor deficits, normal cognition   Extremities: Left BKA    Vitals:    09/22/23 0944   Resp: 18   Weight: 73.3 kg (161 lb 9.6 oz)   Height: 160 cm (62.99\")   PainSc:   5   PainLoc: Back     ECOG score: 0         PHQ-9 Total Score:         Result Review :   The following data was reviewed by: Saroj Tripp MD on 09/22/23:  Lab Results   Component Value Date    HGB 13.2 04/06/2023    HCT 39.5 04/06/2023    MCV 91.4 04/06/2023     (H) 04/06/2023    WBC 9.69 04/06/2023    NEUTROABS 5.15 04/06/2023    LYMPHSABS 3.36 (H) 04/06/2023    MONOSABS 0.85 04/06/2023    EOSABS 0.23 " 04/06/2023    BASOSABS 0.07 04/06/2023     Lab Results   Component Value Date    GLUCOSE 66 04/06/2023    BUN 13 04/06/2023    CREATININE 0.93 04/06/2023     04/06/2023    K 3.8 04/06/2023     04/06/2023    CO2 26.0 04/06/2023    CALCIUM 8.9 04/06/2023    PROTEINTOT 6.9 04/06/2023    ALBUMIN 4.2 04/06/2023    BILITOT <0.2 04/06/2023    ALKPHOS 71 04/06/2023    AST 27 04/06/2023    ALT 23 04/06/2023     No results found for: IRON, LABIRON, TRANSFERRIN, TIBC  No results found for: LDH, FERRITIN, [id number], FOLATE  No results found for: PSA, CEA, AFP, ,     Labs personally reviewed and patient in pre-menopausal range of FSH       Assessment and Plan    Diagnoses and all orders for this visit:    1. Malignant neoplasm of right breast in female, estrogen receptor positive, unspecified site of breast (Primary)  -     anastrozole (ARIMIDEX) 1 MG tablet; Take 1 tablet by mouth Daily.  Dispense: 30 tablet; Refill: 0  -     Cancel: DEXA Bone Density Axial; Future  -     DEXA Bone Density Axial; Future  -     Pregnancy, Urine - Urine, Clean Catch; Future    2. Aromatase inhibitor use  -     Cancel: DEXA Bone Density Axial; Future  -     DEXA Bone Density Axial; Future    3. Adnexal cyst  -     Cancel: US Pelvis Complete; Future  -     US Pelvis Complete; Future    Other orders  -     leuprolide (LUPRON) injection 3.75 mg  -     leuprolide (LUPRON) injection 3.75 mg      ER+ Right Breast Cancer: s/p right mastectomy. pT1cN0. G3, Ki67 80%. Oncotype score is 28 so chemotherapy was recommended but there was significant delay due to the fact that the patient was thought to have chronic osteomyelitis, therefore chemo deferred. Due to high risk grade 3, recommend ovarian suppression with Lupron plus an AI. Will hold on adjuvant abemaciclib as she was node negative and OS data is unknown. 7/2023 FSH/E2 level confirmed premenopausal status. Start monthly lupron today 9/22/23 with anastrozole 1 mg dialy. DEXA  ordered. Pelvic u/s ordered due to right pelvic cysts seen on 4/2023 CT scan. F/u in 1 month    Of note, BRCA testing was negative.      Patient follow up: 1 month  Patient was given instructions and counseling regarding her condition or for health maintenance advice. Please see specific information pulled into the AVS if appropriate.

## 2023-09-27 ENCOUNTER — HOSPITAL ENCOUNTER (OUTPATIENT)
Dept: OCCUPATIONAL THERAPY | Facility: HOSPITAL | Age: 48
Setting detail: THERAPIES SERIES
Discharge: HOME | End: 2023-09-27
Payer: MEDICARE

## 2023-09-27 DIAGNOSIS — C50.411 MALIGNANT NEOPLASM OF UPPER-OUTER QUADRANT OF RIGHT BREAST IN FEMALE, ESTROGEN RECEPTOR POSITIVE: ICD-10-CM

## 2023-09-27 DIAGNOSIS — L90.5 SCAR CONDITION AND FIBROSIS OF SKIN: ICD-10-CM

## 2023-09-27 DIAGNOSIS — Z91.89 AT RISK FOR LYMPHEDEMA: Primary | ICD-10-CM

## 2023-09-27 DIAGNOSIS — Z17.0 MALIGNANT NEOPLASM OF UPPER-OUTER QUADRANT OF RIGHT BREAST IN FEMALE, ESTROGEN RECEPTOR POSITIVE: ICD-10-CM

## 2023-09-27 PROCEDURE — 97165 OT EVAL LOW COMPLEX 30 MIN: CPT | Performed by: OCCUPATIONAL THERAPIST

## 2023-09-27 NOTE — THERAPY EVALUATION
Outpatient Occupational Therapy Lymphedema Initial Evaluation  DELMER Posada     Patient Name: Coby Taylor  : 1975  MRN: [id number]  Today's Date: 2023      Visit Date: 2023    Patient Active Problem List   Diagnosis    Anxiety    Amputated below knee    Broken bones    Depression    Encounter for long-term (current) use of insulin    Fatigue    Gastroesophageal reflux disease without esophagitis    H pylori ulcer    Heavy period    History of below-knee amputation of left lower extremity    Insomnia, unspecified    Left below-knee amputee    Mild episode of recurrent major depressive disorder    Neuroma    Pain in joint involving ankle and foot    Left foot pain    Multiple joint pain    Pain of left lower extremity    Paresthesia    Peripheral neuralgia    Personal history of breast cancer    Post-operative pain    Psoriasis    S/P ankle fusion    Soft tissue infection of foot    Vitamin D deficiency    Wound infection    Malignant neoplasm of right breast, estrogen receptor positive    Osteomyelitis    History of mastectomy    Adnexal cyst    Poor venous access    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 At risk for lymphedema  Z91.89 V49.89   2. Scar condition and fibrosis of skin  L90.5 709.2   3. Malignant neoplasm of upper-outer quadrant of right breast in female, estrogen receptor positive  C50.411 174.4    Z17.0 V86.0        Patient History       Row Name 23 0900             History    Chief Complaint --  Lymphedema Surveillance Program  -TD      Brief Description of Current Complaint Coby is a 48 year old female with a diagnosis  of breast cancer on the right breast.  Pt under went a right mastectomy on 2/2023 and the left breast was removed on 8/2023.  Pt had 3 lymphnodes removed on the RUE.  -TD         Fall Risk Assessment    Any falls in the past year: No  -TD      Does patient have a fear of falling No  -TD         Services    Are you currently receiving Home Health services No  -TD      Do you plan to receive Home Health services in the near future No  -TD         Daily Activities    Primary Language English  -TD      Are you able to read Yes  -TD      Are you able to write Yes  -TD      How does patient learn best? Listening;Reading;Demonstration;Pictures/Video  -TD         Safety    Are you being hurt, hit, or frightened by anyone at home or in your life? No  -TD      Are you being neglected by a caregiver No  -TD      Have you had any of the following issues with N/A  -TD                User Key  (r) = Recorded By, (t) = Taken By, (c) = Cosigned By      Initials Name Provider Type    TD Debbie Santos OT Occupational Therapist                     Lymphedema       Row Name 09/27/23 0900             Subjective Pain    Able to rate subjective pain? yes  -TD      Pre-Treatment Pain Level 6  -TD      Post-Treatment Pain Level 6  -TD      Subjective Pain Comment Left leg  -TD         Subjective    Subjective Comments Pt reports she is always in pain in her left leg  -TD         Lymphedema Assessment    Lymphedema Classification RUE:;at risk/stage 0  -TD      Lymphedema Cancer Related Sx bilateral;simple mastectomy;sentinel node biopsy  -TD      Lymphedema Surgery Comments 2/2023  -TD      Lymph Nodes Removed # 3  -TD      Positive Lymph Nodes # 0  -TD      Chemo Received no  -TD      Radiation Therapy Received no  -TD         LLIS - Physical Concerns    The amount of pain associated with my lymphedema is: 0  -TD      The amount of limb heaviness associated with my lymphedema is: 0  -TD      The amount of skin tightness associated with my  "lymphedema is: 0  -TD      The size of my swollen limb(s) seems: 0  -TD      Lymphedema affects the movement of my swollen limb(s): 0  -TD      The strength in my swollen limb(s) is: 0  -TD         LLIS - Psychosocial Concerns    Lymphedema affects my body image (i.e., \"how I think I look\"). 0  -TD      Lymphedema affects my socializing with others. 0  -TD      Lymphedema affects my intimate relations with spouse or partner (rate 0 if not applicable 0  -TD      Lymphedema \"gets me down\" (i.e., depression, frustration, or anger) 0  -TD      I must rely on others for help due to my lymphedema. 0  -TD      I know what to do to manage my lymphedema 4  -TD         LLIS - Functional Concerns    Lymphedema affects my ability to perform self-care activities (i.e. eating, dressing, hygiene) 0  -TD      Lymphedema affects my ability to perform routine home or work-related activities. 0  -TD      Lymphedema affects my performance of preferred leisure activities. 0  -TD      Lymphedema affects proper fit of clothing/shoes 0  -TD      Lymphedema affects my sleep 0  -TD         Posture/Observations    Posture- WNL Posture is WNL  -TD         General ROM    GENERAL ROM COMMENTS BUE are WFL  -TD         Skin Changes/Observations    Location/Assessment Upper Quadrant  -TD      Upper Quadrant Conditions bilateral:;intact;clean;dry  -TD      Upper Quadrant Color/Pigment bilateral:;normal  -TD         Lymphedema Sensation    Lymphedema Sensation Reports RUE:;LUE:;numbness  -TD         Lymphedema Measurements    Measurement Type(s) Circumferential  -TD      Circumferential Areas Upper extremities  -TD         Lymphedema Life Impact Scale Totals    A.  Total Q1 - Q17 (Do not include Q18) 4  -TD      B.  Total number of questions answered (Q1-Q17) 17  -TD      C. Divide A by B 0.24  -TD      D. Multiple C by 25 6  -TD                User Key  (r) = Recorded By, (t) = Taken By, (c) = Cosigned By      Initials Name Provider Type    TD " Debbie Santos OT Occupational Therapist                              Therapy Education  Education Details: Patient provided education on risk factors for lymphedema to include greater than 6 lymph node removal, BMI greater than 30, mastectomy versus lumpectomy, radiation therapy, and Taxol use and advanced age. Patient provided with the [employer] lymphedema action plan stoplight to recovery handout (Rehabilitation Oncology: July 2019 - Volume 37 - Issue 3 - p 122127 doi: 10.1097/01.REO.[id number]) to improve patient's ability to identify lymph exacerbation and provide guidance on appropriate action to be taken to control symptoms. Patient provided with education on a simple self-manual lymphatic drainage technique. Handouts provided. Patient exhibited fair return demonstration of skill. Patient will benefit from continued education to ensure carryover skill.  Given: HEP, Symptoms/condition management, Edema management, Pain management  Program: New  How Provided: Verbal, Demonstration, Written  Provided to: Patient  Level of Understanding: Teach back education performed, Verbalized, Demonstrated         OT Goals       Row Name 09/27/23 0954          Time Calculation    OT Goal Re-Cert Due Date 10/27/23  -TD               User Key  (r) = Recorded By, (t) = Taken By, (c) = Cosigned By      Initials Name Provider Type    Debbie Barrett, TIARA Occupational Therapist                1. Post Breast Surgery Care/at risk for Lymphedema  LTG 1: 90 days:  As an indicator of no exacerbation of lymphedema staging, the patient will present with an L-Dex score less than [10] points from preoperative baseline.   STATUS: New  STG 1a:   30 days: To prevent exacerbation of mixed edema to lymphedema, patient will utilize the 2 postsurgical compression garments daily.      STATUS: New  STG 1b: 30 days: Patient will be independent with self-manual lymphatic massage.    STATUS: New  STG 1c: 30 days:  Patient will be independent with  identification of signs and symptoms of lymphedema exasperation per stoplight to recovery education handout.   STATUS: New  STG 1 d: 30 days: Patient will be independent with HEP to prevent advancement in lymphedema staging.   STATUS: New  TREATMENT:  Self Care/ADL retraining, Therapeutic Activity, Neuromuscular Re-education, Therapeutic Exercise, Bioimpedence Fluid Analysis, Post-Surgical compression garement [address] [employer] Zip-ST-High/ Vi Camisole Kit 2860K, Orthotic Management and training,  and Manual Therapy.       OT Assessment/Plan       Row Name 09/27/23 0952 09/27/23 0951       OT Assessment    Functional Limitations -- Limitations in functional capacity and performance  -TD    Impairments -- Impaired lymphatic circulation  -TD    Assessment Comments Coby is a 48 year old female with a diagnosis of breast cancer on the right breast. Pt under went a right mastectomy on 2/2023 and the left breast was removed on 8/2023. Pt had 3 lymphnodes removed on the RUE.  Pt measurements are within functional limitis this date. Pt will benefit from continued skilled occupational therapy to prevent increased aging of lymphedema, decreased active range of motion, and increased pain.  -TD --    OT Diagnosis At risk for lymphedema  -TD --    OT Rehab Potential Good  -TD --    Patient/caregiver participated in establishment of treatment plan and goals Yes  -TD --    Patient would benefit from skilled therapy intervention Yes  -TD --       OT Plan    OT Frequency --  See duration  -TD --    Predicted Duration of Therapy Intervention (OT) Patient will be seen in 2 months for postmastectomy orthosis fitting.  Patient will be seen every 3 months years 1 through 3 and every 6 months years 4 and 5.  -TD --    Planned CPT's? OT EVAL LOW COMPLEXITY: 33273;OT RE-EVAL: 97019;OT THER ACT EA 15 MIN: 07854LR;OT THER PROC EA 15 MIN: 32317VT;OT SELF CARE/MGMT/TRAIN 15 MIN: 66903;OT MANUAL THERAPY EA 15 MIN: 75600;OT BIS XTRACELL FLUID  ANALYSIS: 39713;OT ORTHOTIC MGMT/TRAIN EA 15 MIN: 17496;OT ORTHO/PROSTHET CHECKOUT EA 15 MIN: 90294;OT CARE PLAN EA 15 MIN  -TD --    Planned Therapy Interventions (Optional Details) home exercise program;manual therapy techniques;stretching;strengthening;ROM (Range of Motion);prosthetic fitting/training;patient/family education;orthotic fitting/training  -TD --    OT Plan Comments Initiate plan of care  -TD --              User Key  (r) = Recorded By, (t) = Taken By, (c) = Cosigned By      Initials Name Provider Type    TD Debbie Santos, TIARA Occupational Therapist                TIARA saini complexity:   Examination:   Performance Deficits include:  dressing, bathing, grooming   # deficits affecting performance:  3  Decision Making:   Treatment Options Considered : adaption, remediation, prevention  # Treatment options considered : 3  Modification Made for: environment, task   Level of Task Modification: min/mod  Comorbidity Affect Performance: none  How is performance affected: n/a  Evaluation Level Determined:  low               Time Calculation:   Untimed Charges  OT Eval/Re-eval Minutes: 55  Total Minutes  Untimed Charges Total Minutes: 55   Total Minutes: 55     Therapy Charges for Today       Code Description Service Date Service Provider Modifiers Qty    99553606819  OT EVAL LOW COMPLEXITY 4 9/27/2023 Debbie Santos OT GO 1                      Debbie Santos OT  9/27/2023

## 2023-09-28 ENCOUNTER — TELEPHONE (OUTPATIENT)
Dept: ONCOLOGY | Facility: HOSPITAL | Age: 48
End: 2023-09-28

## 2023-09-28 NOTE — TELEPHONE ENCOUNTER
Caller: Coby Taylor    Relationship: Self    Best call back number: [phone]    What was the call regarding: PT CALLED WANTED HER LAB RESULTS

## 2023-09-29 ENCOUNTER — PATIENT OUTREACH (OUTPATIENT)
Dept: ONCOLOGY | Facility: HOSPITAL | Age: 48
End: 2023-09-29
Payer: MEDICARE

## 2023-10-13 ENCOUNTER — PATIENT OUTREACH (OUTPATIENT)
Dept: ONCOLOGY | Facility: HOSPITAL | Age: 48
End: 2023-10-13
Payer: MEDICARE

## 2023-10-16 ENCOUNTER — HOSPITAL ENCOUNTER (OUTPATIENT)
Dept: OCCUPATIONAL THERAPY | Facility: HOSPITAL | Age: 48
Setting detail: THERAPIES SERIES
Discharge: HOME | End: 2023-10-16
Payer: MEDICARE

## 2023-10-16 DIAGNOSIS — C50.411 MALIGNANT NEOPLASM OF UPPER-OUTER QUADRANT OF RIGHT BREAST IN FEMALE, ESTROGEN RECEPTOR POSITIVE: ICD-10-CM

## 2023-10-16 DIAGNOSIS — Z91.89 AT RISK FOR LYMPHEDEMA: Primary | ICD-10-CM

## 2023-10-16 DIAGNOSIS — Z17.0 MALIGNANT NEOPLASM OF UPPER-OUTER QUADRANT OF RIGHT BREAST IN FEMALE, ESTROGEN RECEPTOR POSITIVE: ICD-10-CM

## 2023-10-16 PROCEDURE — 97535 SELF CARE MNGMENT TRAINING: CPT | Performed by: OCCUPATIONAL THERAPIST

## 2023-10-16 NOTE — THERAPY RE-EVALUATION
Outpatient Occupational Therapy Lymphedema Re-Evaluation  [facility] Posada     Patient Name: Coby Taylor  : 1975  MRN: [id number]  Today's Date: 10/16/2023      Visit Date: 10/16/2023    Patient Active Problem List   Diagnosis    Anxiety    Amputated below knee    Broken bones    Depression    Encounter for long-term (current) use of insulin    Fatigue    Gastroesophageal reflux disease without esophagitis    H pylori ulcer    Heavy period    History of below-knee amputation of left lower extremity    Insomnia, unspecified    Left below-knee amputee    Mild episode of recurrent major depressive disorder    Neuroma    Pain in joint involving ankle and foot    Left foot pain    Multiple joint pain    Pain of left lower extremity    Paresthesia    Peripheral neuralgia    Personal history of breast cancer    Post-operative pain    Psoriasis    S/P ankle fusion    Soft tissue infection of foot    Vitamin D deficiency    Wound infection    Malignant neoplasm of right breast, estrogen receptor positive    Osteomyelitis    History of mastectomy    Adnexal cyst    Poor venous access    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 At risk for lymphedema  Z91.89 V49.89   2. Malignant neoplasm of upper-outer quadrant of right breast in female, estrogen receptor positive  C50.411 174.4    Z17.0 V86.0        Patient History       Row Name 10/16/23 0900             History    Chief Complaint --  Lymphedema Surveillance Program  -TD      Brief Description of Current Complaint Coby is a 48 year old female with a diagnosis of breast cancer on the right breast.  Pt under went a  right mastectomy on 2/2023 and the left breast was removed on 8/2023.  Pt had 3 lymphnodes removed on the RUE.  -TD         Fall Risk Assessment    Any falls in the past year: No  -TD      Does patient have a fear of falling No  -TD         Services    Are you currently receiving Home Health services No  -TD      Do you plan to receive Home Health services in the near future No  -TD         Daily Activities    Primary Language English  -TD      Are you able to read Yes  -TD      Are you able to write Yes  -TD      How does patient learn best? Listening;Reading;Demonstration;Pictures/Video  -TD         Safety    Are you being hurt, hit, or frightened by anyone at home or in your life? No  -TD      Are you being neglected by a caregiver No  -TD      Have you had any of the following issues with N/A  -TD                User Key  (r) = Recorded By, (t) = Taken By, (c) = Cosigned By      Initials Name Provider Type    Debbie Barrett OT Occupational Therapist                     Lymphedema       Row Name 10/16/23 0900             Subjective Pain    Able to rate subjective pain? yes  -TD      Pre-Treatment Pain Level 8  -TD      Post-Treatment Pain Level 8  -TD      Subjective Pain Comment left leg  -TD         Subjective    Subjective Comments Pt reports she has increased fluid in left chest wall.  -TD         Lymphedema Assessment    Lymphedema Classification RUE:;at risk/stage 0  -TD      Lymphedema Cancer Related Sx bilateral;simple mastectomy;sentinel node biopsy  -TD      Lymphedema Surgery Comments 2/2023  -TD      Lymph Nodes Removed # 3  -TD      Positive Lymph Nodes # 0  -TD      Chemo Received no  -TD      Radiation Therapy Received no  -TD         LLIS - Physical Concerns    The amount of pain associated with my lymphedema is: 0  -TD      The amount of limb heaviness associated with my lymphedema is: 0  -TD      The amount of skin tightness associated with my lymphedema is: 0  -TD      The size of my swollen  "limb(s) seems: 0  -TD      Lymphedema affects the movement of my swollen limb(s): 0  -TD      The strength in my swollen limb(s) is: 0  -TD         LLIS - Psychosocial Concerns    Lymphedema affects my body image (i.e., \"how I think I look\"). 0  -TD      Lymphedema affects my socializing with others. 0  -TD      Lymphedema affects my intimate relations with spouse or partner (rate 0 if not applicable 0  -TD      Lymphedema \"gets me down\" (i.e., depression, frustration, or anger) 0  -TD      I must rely on others for help due to my lymphedema. 0  -TD      I know what to do to manage my lymphedema 3  -TD         LLIS - Functional Concerns    Lymphedema affects my ability to perform self-care activities (i.e. eating, dressing, hygiene) 0  -TD      Lymphedema affects my ability to perform routine home or work-related activities. 0  -TD      Lymphedema affects my performance of preferred leisure activities. 0  -TD      Lymphedema affects proper fit of clothing/shoes 0  -TD      Lymphedema affects my sleep 0  -TD         Posture/Observations    Posture- WNL Posture is WNL  -TD         General ROM    GENERAL ROM COMMENTS BUE are WFL  -TD         MMT (Manual Muscle Testing)    General MMT Comments BUE are WFL  -TD         Skin Changes/Observations    Location/Assessment Upper Quadrant  -TD      Upper Quadrant Conditions bilateral:;intact;clean;dry  -TD      Upper Quadrant Color/Pigment bilateral:;normal  -TD      Skin Observations Comment Pt noted to have increased fiborous tissue at the 6 o'clock position of the left breast.  -TD         Lymphedema Sensation    Lymphedema Sensation Reports RUE:;LUE:;numbness  -TD         Lymphedema Measurements    Measurement Type(s) Circumferential  -TD      Circumferential Areas Upper extremities  -TD         Lymphedema Life Impact Scale Totals    A.  Total Q1 - Q17 (Do not include Q18) 3  -TD      B.  Total number of questions answered (Q1-Q17) 17  -TD      C. Divide A by B 0.18  -TD   "    D. Multiple C by 25 4.5  -TD                User Key  (r) = Recorded By, (t) = Taken By, (c) = Cosigned By      Initials Name Provider Type    Debbie Barrett OT Occupational Therapist                                Therapy Education  Education Details: Pt was educated on scar massage across the chest wall and reviewed self MLD  Given: HEP, Symptoms/condition management, Edema management, Pain management  Program: New, Reinforced  How Provided: Verbal, Demonstration, Written  Provided to: Patient  Level of Understanding: Teach back education performed, Verbalized, Demonstrated  75703 - OT Self Care/Mgmt Minutes: 15         OT Goals       Row Name 10/16/23 0936          Time Calculation    OT Goal Re-Cert Due Date 11/15/23  -TD               User Key  (r) = Recorded By, (t) = Taken By, (c) = Cosigned By      Initials Name Provider Type    Debbie Barrett OT Occupational Therapist                  UQLymphgoals:   1. Uncontrolled lymphedema of right upper extremity/upper quadrant.  LTG1:  90 days:  Volumetric measurements of right upper extremity/upper quadrant will be decreased by 5 % or until plateau in reduction is achieved to improve functional mobility.           STATUS:  New   STG1a:  30 days:  Volumetric measurements of right upper extremity/upper quadrant will be decreased by 3 % bilaterally to improve functional mobility.    STATUS:  New  TREATMENT:  Manual Therapy, Therapeutic exercise, ADL/self-care therapy, Bioimpedence Fluid Analysis, Orthotic management and training, Therapeutic Activity, wound dressing as needed, Modalities: TENS, NMES, Ultrasound, Fluidotherapy, and Patient and family/caregiver education.    2. Patient with decreased ADL/Self-Care routine for lymphedema management.   LTG 2:  90 days:  Patient/caregiver will independently verbalize/demonstrate ADL/Self-Care routine for lymphedema management.           STATUS:  New   STG 2a:  30 days:  Patient/caregiver will independently perform  or verbally dictate compression wrapping technique of the upper extremity/upper quadrant.           STATUS:  New   STG2b:  30 days:  Patient will independently verbalize signs and symptoms of infection.                    STATUS:  New   STG2c:  30 days:  Patient will independently demonstrate/verbalize proper skin care routine to prevent infection and or wound development.            STATUS:  New  STG2d:  30 days:  Patient will independently perform teach back of HEP routine.           STATUS: New  STG2e:  30 days:  Patient/caregiver will obtain properly fitting compression orthosis, will demonstrate don/doff skill of compression orthosis with modified independence, and independently verbalize wear schedule.          STATUS: New   STG2f: 30 days: Patient/caregiver will independently perform self-manual lymphatic drainage of the upper extremity/upper quadrant.          STATUS: New  TREATMENT:  Therapeutic Activity, Patient/Caregiver Education, ADL retraining, Manual Therapy, Orthotic Management and training, Modalities: TENS, NMES, Ultrasound, Fluidotherapy Wound dressing if necessary, Therapeutic Exercise, Modalities    3. Self-Care Limitations     LTG 3: 90 days:  The patient will demonstrate improved quality of life by achieving a score of 15 on the Lymphedema Life Impact Scale.           STATUS:  New   STG 3a: 30 days:  The patient will demonstrate improved quality of life by achieving a score of 20 on the Lymphedema Life Impact Scale.           STATUS:  New  TREATMENT:  Manual therapy, therapeutic exercise, therapeutic activity, ADL retraining, Patient/caregiver education, Modalities: TENS, NMES, Ultrasound, Fluidotherapy, Orthotic Management and Training, wound dressing as needed.       OT Assessment/Plan       Row Name 10/16/23 0936 10/16/23 0934       OT Assessment    Functional Limitations Limitations in functional capacity and performance  -TD Limitations in functional capacity and performance  -TD     Impairments Impaired lymphatic circulation  -TD Impaired lymphatic circulation  -TD    Assessment Comments -- Coby is a 48 year old female with a diagnosis of breast cancer on the right breast. Pt under went a right mastectomy on 2/2023 and the left breast was removed on 8/2023. Pt had 3 lymphnodes removed on the RUE. Pt measurements are within functional limitis this date.  Pt has an increase in fiborous tissue in the left bresat.  Pt will benefit from continued skilled occupational therapy to prevent increased aging of lymphedema, decreased active range of motion, and increased pain.  -TD    OT Diagnosis -- at risk for lymphedema  -TD    OT Rehab Potential -- Good  -TD    Patient/caregiver participated in establishment of treatment plan and goals -- Yes  -TD    Patient would benefit from skilled therapy intervention -- Yes  -TD       OT Plan    OT Frequency -- --  see duration  -TD    Predicted Duration of Therapy Intervention (OT) -- Patient will be seen in 1 month for postmastectomy orthosis fitting. Patient will be seen every 3 months years 1 through 3 and every 6 months years 4 and 5.  -TD    Planned CPT's? -- OT EVAL LOW COMPLEXITY: 14770;OT RE-EVAL: 96112;OT THER ACT EA 15 MIN: 69623IL;OT THER PROC EA 15 MIN: 57882FC;OT SELF CARE/MGMT/TRAIN 15 MIN: 83931;OT MANUAL THERAPY EA 15 MIN: 41998;OT BIS XTRACELL FLUID ANALYSIS: 99248;OT ORTHOTIC MGMT/TRAIN EA 15 MIN: 46377;OT ORTHO/PROSTHET CHECKOUT EA 15 MIN: 81644;OT CARE PLAN EA 15 MIN  -TD    Planned Therapy Interventions (Optional Details) -- home exercise program;manual therapy techniques;stretching;strengthening;ROM (Range of Motion);prosthetic fitting/training;patient/family education;orthotic fitting/training  -TD    OT Plan Comments -- contiue POC  -TD              User Key  (r) = Recorded By, (t) = Taken By, (c) = Cosigned By      Initials Name Provider Type    Debbie Barrett OT Occupational Therapist                              Time Calculation:    Timed Charges  91971 - OT Self Care/Mgmt Minutes: 15  Total Minutes  Timed Charges Total Minutes: 15   Total Minutes: 15     Therapy Charges for Today       Code Description Service Date Service Provider Modifiers Qty    [id number]  OT SELF CARE/MGMT/TRAIN EA 15 MIN 10/16/2023 Debbie Santos OT GO 1                      Debbie Santos OT  10/16/2023

## 2023-10-20 ENCOUNTER — HOSPITAL ENCOUNTER (OUTPATIENT)
Dept: ONCOLOGY | Facility: HOSPITAL | Age: 48
Discharge: HOME | End: 2023-10-20
Payer: MEDICARE

## 2023-10-20 ENCOUNTER — LAB (OUTPATIENT)
Dept: ONCOLOGY | Facility: HOSPITAL | Age: 48
End: 2023-10-20
Payer: MEDICARE

## 2023-10-20 ENCOUNTER — OFFICE VISIT (OUTPATIENT)
Dept: ONCOLOGY | Facility: HOSPITAL | Age: 48
End: 2023-10-20
Payer: MEDICARE

## 2023-10-20 VITALS
SYSTOLIC BLOOD PRESSURE: 109 MMHG | WEIGHT: 157.63 LBS | TEMPERATURE: 97.8 F | HEART RATE: 83 BPM | HEIGHT: 63 IN | RESPIRATION RATE: 16 BRPM | DIASTOLIC BLOOD PRESSURE: 59 MMHG | OXYGEN SATURATION: 98 % | BODY MASS INDEX: 27.93 KG/M2

## 2023-10-20 DIAGNOSIS — C50.911 MALIGNANT NEOPLASM OF RIGHT BREAST IN FEMALE, ESTROGEN RECEPTOR POSITIVE, UNSPECIFIED SITE OF BREAST: Primary | ICD-10-CM

## 2023-10-20 DIAGNOSIS — Z17.0 MALIGNANT NEOPLASM OF RIGHT BREAST IN FEMALE, ESTROGEN RECEPTOR POSITIVE, UNSPECIFIED SITE OF BREAST: Primary | ICD-10-CM

## 2023-10-20 DIAGNOSIS — C50.911 MALIGNANT NEOPLASM OF RIGHT BREAST IN FEMALE, ESTROGEN RECEPTOR POSITIVE, UNSPECIFIED SITE OF BREAST: ICD-10-CM

## 2023-10-20 DIAGNOSIS — Z71.6 TOBACCO ABUSE COUNSELING: Primary | ICD-10-CM

## 2023-10-20 DIAGNOSIS — Z17.0 MALIGNANT NEOPLASM OF RIGHT BREAST IN FEMALE, ESTROGEN RECEPTOR POSITIVE, UNSPECIFIED SITE OF BREAST: ICD-10-CM

## 2023-10-20 LAB — B-HCG UR QL: NEGATIVE

## 2023-10-20 PROCEDURE — 81025 URINE PREGNANCY TEST: CPT | Performed by: INTERNAL MEDICINE

## 2023-10-20 PROCEDURE — 25010000002 LEUPROLIDE PER 3.75 MG: Performed by: INTERNAL MEDICINE

## 2023-10-20 PROCEDURE — 96402 CHEMO HORMON ANTINEOPL SQ/IM: CPT

## 2023-10-20 RX ORDER — ONDANSETRON HYDROCHLORIDE 8 MG/1
TABLET, FILM COATED ORAL
COMMUNITY
Start: 2023-10-17

## 2023-10-20 RX ADMIN — LEUPROLIDE ACETATE 3.75 MG: KIT at 09:45

## 2023-10-20 NOTE — PROGRESS NOTES
Chief Complaint/Reason for Referral:  Breast Cancer    Pat Alfredo, Pat Mccord, APRN    Subjective    History of Present Illness    Ms. Coby Taylor presents with her  for Lupron injection today for ER+ right sided breast cancer diagnosed in January of 2023. Recently saw Dr. Tripp since Dr. Pepe left the practice. Today, is her second dose of Lupron and has tolerated well.     She is taking Anastrozole and tolerating well. Completed bilateral mastectomies.     Reviewed transvaginal US. Reviewed bone density today. Review CT scan of the chest as well. BMD is normal. Pelvic US with 2 area's noting 5.7 cm mass with exophytic lutem mass or ovarian cyst and a left ovary with 6.0 cm sepated cystic area consistent with ovarian cyst.         Cancer Staging   Malignant neoplasm of right breast, estrogen receptor positive  Staging form: Breast, AJCC 8th Edition  - Pathologic stage from 2/27/2023: Stage IA (pT1c, pN0(sn), cM0, G3, ER+, VA+, HER2-, Oncotype DX score: 28) - Signed by Janette Pepe MD PhD on 4/6/2023       Treatment intent: curative    Oncology/Hematology History Overview Note     ER+ Right Breast Cancer:    - presented with enlarging palpable right breast mass in January 2023    - Diagnostic mammogram and US: Ultrasound:  A targeted ultrasound of the right breast reveals several small cysts and 0900 hours 5 cm from the nipple.  At the 11 o'clock position there is an approximately 1.9 by 1.5 1.7 cm irregular somewhat hypoechoic mass that looks like it is taller than wide. This could account for the architectural distortion.  On evaluation of the right axilla there is a prominent lymph node measuring approximately 2 x 1.5 x 1 cm with eccentric prominent cortex.    - US guided right breast biopsy and SNL biopsy:  right breast, 11:00 position biopsy positive for invasive ductal carcinoma, G2, 13mm, ER+ (95%), VA+ (70%), HER2- (1+), Ki67 40%. Right axilla lymphoid tissue present,  negative for invasive carcinoma. pT1cN0.    -Right Mastectomy and SLNB on 2/27/23 (Dr. Miguel Houston): invasive ductal carcinoma, G3, 1.8 cm, no PNI, no LVI, no carcinoma in situ, margins negative but superior margin close (<1mm), ER+ (90%), FL+ (60%), HER2 negative by FISH, Ki67 80%    - Oncotype score was 28.  Chemotherapy was delayed due to reported osteomyelitis.      - bone scan on 3/16/23: negative for metastatic disease .    - Transferred care here in April 2023.    - CT CAP was negative for metastatic disease on 4/12/23.    -  Pt was later cleared for chemotherapy when osteomyelitis was ruled out in July of 2023. Five months passed since surgery so chemotherapy was not given.     - 7/2023: FSH and estradiol levels in pre-menopausal range    - 9/22/23: Start AI with anastrozole plus Lupron. Plan for at least 5 years     Malignant neoplasm of right breast, estrogen receptor positive   2/27/2023 Cancer Staged    Staging form: Breast, AJCC 8th Edition  - Pathologic stage from 2/27/2023: Stage IA (pT1c, pN0(sn), cM0, G3, ER+, FL+, HER2-, Oncotype DX score: 28) - Signed by Janette Pepe MD PhD on 4/6/2023 4/6/2023 Initial Diagnosis    Malignant neoplasm of right breast, estrogen receptor positive     7/21/2023 - 7/21/2023 Chemotherapy    OP SUPPORTIVE Leuprolide 7.5 mg Q28D     9/22/2023 - 9/22/2023 Chemotherapy    OP SUPPORTIVE BREAST Leuprolide 3.75 MG Q28D     10/20/2023 -  Chemotherapy    OP SUPPORTIVE BREAST Leuprolide 3.75 MG Q28D         Review of Systems   Constitutional:  Positive for fatigue. Negative for appetite change, diaphoresis, fever, unexpected weight gain and unexpected weight loss.   HENT:  Negative for hearing loss, mouth sores, sore throat, swollen glands, trouble swallowing and voice change.    Eyes:  Negative for blurred vision.   Respiratory:  Negative for cough, shortness of breath and wheezing.    Cardiovascular:  Negative for chest pain and palpitations.   Gastrointestinal:   Negative for abdominal pain, blood in stool, constipation, diarrhea, nausea and vomiting.   Endocrine: Negative for cold intolerance and heat intolerance.   Genitourinary:  Negative for difficulty urinating, dysuria, frequency, hematuria and urinary incontinence.   Musculoskeletal:  Negative for arthralgias, back pain and myalgias.   Skin:  Negative for rash, skin lesions and wound.   Neurological:  Negative for dizziness, seizures, weakness, numbness and headache.   Hematological:  Does not bruise/bleed easily.   Psychiatric/Behavioral:  Negative for depressed mood. The patient is not nervous/anxious.        Current Outpatient Medications on File Prior to Visit   Medication Sig Dispense Refill    Acetaminophen Extra Strength 500 MG tablet TAKE TWO TABLETS BY MOUTH EVERY 8 HOURS FOR 7 DAYS. AFTER 7 THEN TAKE ONE TABLET BY MOUTH EVERY 8 HOURS AS NEEDED      ALPRAZolam (XANAX) 0.5 MG tablet TAKE 1 TABLET BY MOUTH ONCE DAILY FOR 14 DAYS      anastrozole (ARIMIDEX) 1 MG tablet Take 1 tablet by mouth Daily. 30 tablet 0    aspirin 81 MG EC tablet aspirin 81 mg tablet,delayed release   TAKE ONE TABLET BY MOUTH DAILY      atorvastatin (LIPITOR) 10 MG tablet atorvastatin 10 mg tablet   TAKE ONE TABLET BY MOUTH DAILY      buPROPion SR (WELLBUTRIN SR) 150 MG 12 hr tablet bupropion HCl  mg tablet,12 hr sustained-release   TAKE ONE TABLET BY MOUTH ONCE DAILY      busPIRone (BUSPAR) 10 MG tablet Take 1 tablet by mouth 2 (Two) Times a Day.      Calcium Carbonate 1500 (600 Ca) MG tablet Calcium 600 600 mg (1,500 mg) oral tablet take 1 tablet by oral route daily   Suspended      Diclofenac Sodium (VOLTAREN) 1 % gel gel diclofenac 1 % topical gel   APPLY 4gm TO affected wrist EVERY 6 HOURS AS NEEDED FOR 10 DAYS      diphenhydrAMINE-zinc acetate 2-0.1 % cream 1 application.      doxycycline (VIBRAMYCIN) 100 MG capsule Take 1 capsule by mouth Daily.      DULoxetine (CYMBALTA) 60 MG capsule Take 1 capsule by mouth Daily.       fluocinonide (LIDEX) 0.05 % external solution APPLY TO psoriasis OF SCALP EVERY DAY AS NEEDED FOR ITCHING      gabapentin (NEURONTIN) 400 MG capsule TAKE ONE CAPSULE BY MOUTH FOUR TIMES DAILY AS DIRECTED      gabapentin (NEURONTIN) 400 MG capsule Every 6 (Six) Hours.      hydrOXYzine (ATARAX) 10 MG tablet Take 1 tablet by mouth Every 8 (Eight) Hours As Needed.      leuprolide (LUPRON) 3.75 MG injection Inject 3.75 mg into the appropriate muscle as directed by prescriber.      lisinopril (PRINIVIL,ZESTRIL) 10 MG tablet Take 1 tablet by mouth Daily.      Melatonin 10 MG tablet Take 3 tablets by mouth.      metroNIDAZOLE (FLAGYL) 250 MG tablet       mupirocin (BACTROBAN) 2 % ointment apply 1 application externally TWICE DAILY FOR 5 DAYS      naloxone (NARCAN) 4 MG/0.1ML nasal spray 1 spray into the nostril(s) as directed by provider.      nystatin (MYCOSTATIN) 100,000 unit/mL suspension nystatin 100,000 unit/mL oral suspension   SWISH AND SPIT 5 mls BY MOUTH FOUR TIMES DAILY FOR 10 DAYS      omeprazole (priLOSEC) 20 MG capsule omeprazole 20 mg capsule,delayed release      ondansetron (ZOFRAN) 8 MG tablet       ondansetron ODT (ZOFRAN-ODT) 8 MG disintegrating tablet Place 1 tablet on the tongue Every 8 (Eight) Hours As Needed for Nausea or Vomiting. 90 tablet 1    oxyCODONE-acetaminophen (PERCOCET)  MG per tablet Take 1 tablet by mouth Every 4 (Four) Hours As Needed.      pantoprazole (PROTONIX) 40 MG EC tablet Take 1 tablet by mouth Every 12 (Twelve) Hours.      QUEtiapine (SEROquel) 200 MG tablet Take 1 tablet by mouth Daily.      Scopolamine 1 MG/3DAYS patch Place 1 patch on the skin as directed by provider Every 72 (Seventy-Two) Hours. 10 patch 1    sertraline (ZOLOFT) 100 MG tablet Take 1.5 tablets by mouth Daily.      varenicline (CHANTIX) 0.5 MG tablet Chantix 0.5 mg tablet      vitamin D (ERGOCALCIFEROL) 1.25 MG (59052 UT) capsule capsule Take 1 capsule by mouth 1 (One) Time Per Week.       Current  "Facility-Administered Medications on File Prior to Visit   Medication Dose Route Frequency Provider Last Rate Last Admin    [COMPLETED] leuprolide (LUPRON) injection 3.75 mg  3.75 mg Intramuscular Once Saroj Tripp MD   3.75 mg at 10/20/23 0914       Allergies   Allergen Reactions    Morphine GI Intolerance, Nausea And Vomiting and Other (See Comments)     faint  \"Faint\"  \"Faint\"      Hydroxyzine Itching    Meloxicam Itching     Past Medical History:   Diagnosis Date    Breast cancer     Lupus      Past Surgical History:   Procedure Laterality Date    BELOW KNEE LEG AMPUTATION Left     CARPAL TUNNEL RELEASE Bilateral     MASTECTOMY Right     also 3 RT breast nodules removed     Social History     Socioeconomic History    Marital status:    Tobacco Use    Smoking status: Every Day     Packs/day: 1.5     Types: Cigarettes    Smokeless tobacco: Never   Vaping Use    Vaping Use: Never used   Substance and Sexual Activity    Alcohol use: Not Currently    Drug use: Never    Sexual activity: Defer     Family History   Problem Relation Age of Onset    Pancreatic cancer Mother     Diabetes Mother     Stroke Mother     Stroke Father     Heart attack Father     Heart failure Father     Stroke Sister      Immunization History   Administered Date(s) Administered    COVID-19 (MODERNA) 1st,2nd,3rd Dose Monovalent 08/25/2021    Tdap 08/29/2014       Tobacco Use: High Risk (10/20/2023)    Patient History     Smoking Tobacco Use: Every Day     Smokeless Tobacco Use: Never     Passive Exposure: Not on file       Objective     Physical Exam  Vitals and nursing note reviewed.   Constitutional:       Appearance: Normal appearance. She is normal weight.   HENT:      Head: Normocephalic.      Nose: Nose normal.      Mouth/Throat:      Mouth: Mucous membranes are moist.   Eyes:      Pupils: Pupils are equal, round, and reactive to light.   Cardiovascular:      Rate and Rhythm: Normal rate and regular rhythm.      Pulses: " "Normal pulses.      Heart sounds: Normal heart sounds.   Pulmonary:      Effort: Pulmonary effort is normal.      Breath sounds: Wheezing and rhonchi present.   Abdominal:      Palpations: Abdomen is soft.   Musculoskeletal:         General: Normal range of motion.      Cervical back: Normal range of motion and neck supple.   Skin:     General: Skin is warm.      Capillary Refill: Capillary refill takes less than 2 seconds.   Neurological:      General: No focal deficit present.      Mental Status: She is alert and oriented to person, place, and time.   Psychiatric:         Mood and Affect: Mood normal.         Behavior: Behavior normal.         Thought Content: Thought content normal.         Judgment: Judgment normal.         Vitals:    10/20/23 0842   BP: 109/59   Pulse: 83   Resp: 16   Temp: 97.8 °F (36.6 °C)   SpO2: 98%   Weight: 71.5 kg (157 lb 10.1 oz)   Height: 160 cm (62.99\")   PainSc: 0-No pain       Wt Readings from Last 3 Encounters:   10/20/23 71.5 kg (157 lb 10.1 oz)   09/22/23 73.3 kg (161 lb 9.6 oz)   07/07/23 75.1 kg (165 lb 9.1 oz)               ECOG score: 0         ECOG: (0) Fully Active - Able to Carry On All Pre-disease Performance Without Restriction  Fall Risk Assessment was completed, and patient is at low risk for falls.  PHQ-9 Total Score: 0       The patient is  experiencing fatigue. Fatigue score: 5    PT/OT Functional Screening: PT fx screen : No needs identified  Speech Functional Screening: Speech fx screen : No needs identified  Rehab to be ordered: Rehab to be ordered : No needs identified        Result Review :  The following data was reviewed by: LOUISA Vazquez on 10/20/2023:  Lab Results   Component Value Date    HGB 13.2 04/06/2023    HCT 39.5 04/06/2023    MCV 91.4 04/06/2023     (H) 04/06/2023    WBC 9.69 04/06/2023    NEUTROABS 5.15 04/06/2023    LYMPHSABS 3.36 (H) 04/06/2023    MONOSABS 0.85 04/06/2023    EOSABS 0.23 04/06/2023    BASOSABS 0.07 04/06/2023 " "    Lab Results   Component Value Date    GLUCOSE 66 04/06/2023    BUN 13 04/06/2023    CREATININE 0.93 04/06/2023     04/06/2023    K 3.8 04/06/2023     04/06/2023    CO2 26.0 04/06/2023    CALCIUM 8.9 04/06/2023    PROTEINTOT 6.9 04/06/2023    ALBUMIN 4.2 04/06/2023    BILITOT <0.2 04/06/2023    ALKPHOS 71 04/06/2023    AST 27 04/06/2023    ALT 23 04/06/2023        No results found for: \"IRON\", \"LABIRON\", \"TRANSFERRIN\", \"TIBC\"  No results found for: \"LDH\", \"FERRITIN\", \"YLPQLCAR90\", \"FOLATE\"  No results found for: \"PSA\", \"CEA\", \"AFP\", \"\", \"\"    CT chest with and without contrast per contrast protocol    Result Date: 10/19/2023  No CT evidence of metastatic disease. Electronically Signed: Vivek Mclean MD 2023/10/19 at 13:35 CDT Reading Location ID and State: [address] / [place] Tel [phone], Service support  [phone], Fax [phone]    DEXA Bone Density Axial    Result Date: 10/12/2023  The patient is considered normal as outlined below according to World Raj Organization (WHO) criteria with a low fracture risk. ___________________________________ Reference Information: The T-score is the number of standard deviations above or below the standard which is normal for young adults at their peak bone mineral density. The World Health Organization (WHO) interprets the T-scores as follows: Above  -1          Normal bone density Between -1 and -2.5    Osteopenia Equal to / or below -2.5  Osteoporosis As a practical clinical guideline, osteopenia may be graded as follows: Mild -1 through -1.5 Moderate -1.6  through -2.0 Severe  -2.1  through -2.4 The Z-score is the number of standard deviations above or below age-matched controls. A Z-score of less than -1.5 would be considered abnormal. References: 1.  NIH Osteoporosis and Related Bone Diseases www osteo.org 2.  International Society for Clinical Densitometry www iscd.org 3.  National Osteoporosis Foundation www nof.org Electronically Signed: " Vivek Mclean MD 2023/10/12 at 13:09 CDT Reading Location ID and State: [address] / [place] Tel [phone], Service support  [phone], Fax [phone]    US Pelvis Transvaginal Non OB    Result Date: 10/12/2023  Cystic masses in both adnexa and correlation with CT or MRI may be useful. Electronically Signed: Vivek Mclean MD 2023/10/12 at 13:08 CDT Reading Location ID and State: [address] / [place] Tel [phone], Service support  [phone], Fax [phone]    Mammo Diagnostic Left With CAD    Result Date: 7/5/2023  Stable unilateral diagnostic mammogram. Ultrasound of the palpable abnormality in the upper outer quadrant left breast will be obtained. ___________________________________ ASSESSMENT CATEGORY: BIRADS Category 0:  Incomplete.  Need additional imaging evaluation.  A letter regarding these results will be sent to the patient by the facility within 30 days. FOLLOW-UP RECOMMENDATION: Ultrasound recommended. (I) Approximately 10% of breast cancers are not detected by mammography.  A normal mammogram should not delay biopsy of a clinically suspicious abnormality. Electronically Signed: Vivek Mclean MD 2023/07/05 at 17:49 CDT Reading Location ID and State: [address] / [place] Tel [phone], Service support  [phone], Fax [phone]    US Breast Bilateral Limited    Result Date: 7/3/2023  1.  Fibrocystic change of the left breast. This requires no further follow-up. 2.  Thin postoperative seroma at the site a right mastectomy. ___________________________________ ASSESSMENT CATEGORY: BIRADS Category 2:  Benign.  A letter regarding these results will be sent to the patient by the facility within 30 days. Electronically Signed: Vivek Mclean MD 2023/07/03 at 15:57 CDT Reading Location ID and State: [address] / [place] Tel [phone], Service support  [phone], Fax [phone]           Assessment and Plan:  Diagnoses and all orders for this visit:    1. Tobacco abuse counseling (Primary)    2.  Malignant neoplasm of right breast in female, estrogen receptor positive, unspecified site of breast    ER+ Right Breast Cancer: s/p right mastectomy. pT1cN0. G3, Ki67 80%. Oncotype score is 28 so chemotherapy was recommended but there was significant delay due to the fact that the patient was thought to have chronic osteomyelitis, therefore chemo deferred. Due to high risk grade 3, recommend ovarian suppression with Lupron plus an AI. Will hold on adjuvant abemaciclib as she was node negative and OS data is unknown. 7/2023 FSH/E2 level confirmed premenopausal status. Start monthly lupron today 9/22/23 with anastrozole 1 mg dialy. DEXA ordered. BRCA testing was negative. Tolerating the Anastrozole well with ongoing issues.     Pelvic u/s ordered due to right pelvic cysts seen on 4/2023 CT scan. F/u in 1 month. Discussed scan results. She will follow up with her GYN for further intervention when she follows with them.     Dexa scan reviewed. Will repeat every 2 years.        Of note, BRCA testing was negative.    She has wheezes in her lungs. Briefly discussed tobacco cessation.        I spent 30 minutes caring for Coby on this date of service. This time includes time spent by me in the following activities:preparing for the visit, reviewing tests, performing a medically appropriate examination and/or evaluation , counseling and educating the patient/family/caregiver, ordering medications, tests, or procedures, referring and communicating with other health care professionals , documenting information in the medical record, and independently interpreting results and communicating that information with the patient/family/caregiver    Patient Follow Up: follow up monthly for lupron injection.       Patient was given instructions and counseling regarding her condition or for health maintenance advice. Please see specific information pulled into the AVS if appropriate.     Florina Bob,  APRN    10/20/2023    .tob

## 2023-11-17 ENCOUNTER — HOSPITAL ENCOUNTER (OUTPATIENT)
Dept: ONCOLOGY | Facility: HOSPITAL | Age: 48
Discharge: HOME | End: 2023-11-17
Payer: MEDICARE

## 2023-11-22 ENCOUNTER — HOSPITAL ENCOUNTER (OUTPATIENT)
Dept: ONCOLOGY | Facility: HOSPITAL | Age: 48
Discharge: HOME | End: 2023-11-22
Admitting: INTERNAL MEDICINE
Payer: MEDICARE

## 2023-11-22 VITALS
TEMPERATURE: 97.5 F | OXYGEN SATURATION: 97 % | SYSTOLIC BLOOD PRESSURE: 116 MMHG | HEART RATE: 61 BPM | DIASTOLIC BLOOD PRESSURE: 69 MMHG

## 2023-11-22 DIAGNOSIS — C50.911 MALIGNANT NEOPLASM OF RIGHT BREAST IN FEMALE, ESTROGEN RECEPTOR POSITIVE, UNSPECIFIED SITE OF BREAST: Primary | ICD-10-CM

## 2023-11-22 DIAGNOSIS — Z17.0 MALIGNANT NEOPLASM OF RIGHT BREAST IN FEMALE, ESTROGEN RECEPTOR POSITIVE, UNSPECIFIED SITE OF BREAST: Primary | ICD-10-CM

## 2023-11-22 PROCEDURE — 96402 CHEMO HORMON ANTINEOPL SQ/IM: CPT

## 2023-11-22 PROCEDURE — 25010000002 LEUPROLIDE PER 3.75 MG: Performed by: INTERNAL MEDICINE

## 2023-11-22 RX ADMIN — LEUPROLIDE ACETATE 3.75 MG: KIT at 11:34

## 2023-11-29 DIAGNOSIS — Z17.0 MALIGNANT NEOPLASM OF RIGHT BREAST IN FEMALE, ESTROGEN RECEPTOR POSITIVE, UNSPECIFIED SITE OF BREAST: ICD-10-CM

## 2023-11-29 DIAGNOSIS — C50.911 MALIGNANT NEOPLASM OF RIGHT BREAST IN FEMALE, ESTROGEN RECEPTOR POSITIVE, UNSPECIFIED SITE OF BREAST: ICD-10-CM

## 2023-11-29 RX ORDER — ANASTROZOLE 1 MG/1
1 TABLET ORAL DAILY
Qty: 30 TABLET | Refills: 5 | Status: SHIPPED | OUTPATIENT
Start: 2023-11-29

## 2024-04-12 ENCOUNTER — TRANSCRIBE ORDERS (OUTPATIENT)
Dept: ADMINISTRATIVE | Facility: HOSPITAL | Age: 49
End: 2024-04-12
Payer: MEDICARE

## 2024-04-12 DIAGNOSIS — R93.5 ABNORMAL ABDOMINAL ULTRASOUND: ICD-10-CM

## 2024-04-12 DIAGNOSIS — C50.919 MALIGNANT NEOPLASM OF FEMALE BREAST, UNSPECIFIED ESTROGEN RECEPTOR STATUS, UNSPECIFIED LATERALITY, UNSPECIFIED SITE OF BREAST: Primary | ICD-10-CM

## 2024-04-18 ENCOUNTER — TRANSCRIBE ORDERS (OUTPATIENT)
Dept: ADMINISTRATIVE | Facility: HOSPITAL | Age: 49
End: 2024-04-18
Payer: MEDICARE

## 2024-04-18 DIAGNOSIS — C50.411 MALIGNANT NEOPLASM OF UPPER-OUTER QUADRANT OF RIGHT BREAST IN FEMALE, ESTROGEN RECEPTOR POSITIVE: ICD-10-CM

## 2024-04-18 DIAGNOSIS — C50.411 MALIGNANT NEOPLASM OF UPPER-OUTER QUADRANT OF RIGHT FEMALE BREAST, UNSPECIFIED ESTROGEN RECEPTOR STATUS: Primary | ICD-10-CM

## 2024-04-18 DIAGNOSIS — Z17.0 ESTROGEN RECEPTOR POSITIVE: ICD-10-CM

## 2024-04-18 DIAGNOSIS — Z17.0 MALIGNANT NEOPLASM OF UPPER-OUTER QUADRANT OF RIGHT BREAST IN FEMALE, ESTROGEN RECEPTOR POSITIVE: ICD-10-CM

## 2024-04-19 ENCOUNTER — HOSPITAL ENCOUNTER (OUTPATIENT)
Dept: PET IMAGING | Facility: HOSPITAL | Age: 49
Discharge: HOME | End: 2024-04-19
Payer: MEDICARE

## 2024-04-19 DIAGNOSIS — Z17.0 ESTROGEN RECEPTOR POSITIVE: ICD-10-CM

## 2024-04-19 DIAGNOSIS — C50.411 MALIGNANT NEOPLASM OF UPPER-OUTER QUADRANT OF RIGHT BREAST IN FEMALE, ESTROGEN RECEPTOR POSITIVE: ICD-10-CM

## 2024-04-19 DIAGNOSIS — Z17.0 MALIGNANT NEOPLASM OF UPPER-OUTER QUADRANT OF RIGHT BREAST IN FEMALE, ESTROGEN RECEPTOR POSITIVE: ICD-10-CM

## 2024-04-19 PROCEDURE — 78815 PET IMAGE W/CT SKULL-THIGH: CPT

## 2024-04-19 PROCEDURE — A9552 F18 FDG: HCPCS | Performed by: SURGERY

## 2024-04-19 PROCEDURE — 0 FLUDEOXYGLUCOSE F18 SOLUTION: Performed by: SURGERY

## 2024-04-19 RX ADMIN — FLUDEOXYGLUCOSE F 18 1 DOSE: 200 INJECTION, SOLUTION INTRAVENOUS at 10:45

## 2024-04-25 ENCOUNTER — HOSPITAL ENCOUNTER (OUTPATIENT)
Dept: OTHER | Facility: HOSPITAL | Age: 49
Discharge: HOME | End: 2024-04-25